# Patient Record
Sex: MALE | Race: WHITE | NOT HISPANIC OR LATINO | Employment: OTHER | URBAN - METROPOLITAN AREA
[De-identification: names, ages, dates, MRNs, and addresses within clinical notes are randomized per-mention and may not be internally consistent; named-entity substitution may affect disease eponyms.]

---

## 2017-07-18 ENCOUNTER — HOSPITAL ENCOUNTER (OUTPATIENT)
Dept: PHYSICAL MEDICINE AND REHAB | Facility: CLINIC | Age: 37
Discharge: HOME | End: 2017-07-18
Attending: PHYSICIAN ASSISTANT

## 2017-07-18 DIAGNOSIS — M54.50 LUMBAGO: ICD-10-CM

## 2017-07-18 DIAGNOSIS — M54.16 RIGHT LUMBAR RADICULITIS: ICD-10-CM

## 2017-07-18 ASSESSMENT — MIFFLIN-ST. JEOR: SCORE: 1974.26

## 2017-07-26 ENCOUNTER — HOSPITAL ENCOUNTER (OUTPATIENT)
Dept: MRI IMAGING | Facility: CLINIC | Age: 37
Discharge: HOME | End: 2017-07-26
Attending: PHYSICIAN ASSISTANT

## 2017-07-26 ENCOUNTER — COMMUNICATION - HEALTHEAST (OUTPATIENT)
Dept: TELEHEALTH | Facility: CLINIC | Age: 37
End: 2017-07-26

## 2017-07-26 DIAGNOSIS — M54.16 RIGHT LUMBAR RADICULITIS: ICD-10-CM

## 2017-07-26 DIAGNOSIS — M54.50 LUMBAGO: ICD-10-CM

## 2017-07-28 ENCOUNTER — COMMUNICATION - HEALTHEAST (OUTPATIENT)
Dept: PHYSICAL MEDICINE AND REHAB | Facility: CLINIC | Age: 37
End: 2017-07-28

## 2017-08-03 ENCOUNTER — HOSPITAL ENCOUNTER (OUTPATIENT)
Dept: PHYSICAL MEDICINE AND REHAB | Facility: CLINIC | Age: 37
Discharge: HOME | End: 2017-08-03
Attending: PHYSICIAN ASSISTANT

## 2017-08-03 DIAGNOSIS — G47.9 SLEEP DISTURBANCE: ICD-10-CM

## 2017-08-03 DIAGNOSIS — M79.18 MYOFASCIAL PAIN: ICD-10-CM

## 2017-08-03 DIAGNOSIS — M54.50 LUMBALGIA: ICD-10-CM

## 2017-08-03 DIAGNOSIS — M51.26 LUMBAR DISC HERNIATION: ICD-10-CM

## 2017-08-03 DIAGNOSIS — M54.16 LUMBAR RADICULITIS: ICD-10-CM

## 2017-08-03 ASSESSMENT — MIFFLIN-ST. JEOR: SCORE: 1961.11

## 2017-08-14 ENCOUNTER — HOSPITAL ENCOUNTER (OUTPATIENT)
Dept: PHYSICAL MEDICINE AND REHAB | Facility: CLINIC | Age: 37
Discharge: HOME | End: 2017-08-14
Attending: PHYSICIAN ASSISTANT

## 2017-08-14 DIAGNOSIS — M79.18 MYOFASCIAL PAIN: ICD-10-CM

## 2017-08-14 DIAGNOSIS — M54.16 LUMBAR RADICULITIS: ICD-10-CM

## 2017-08-14 DIAGNOSIS — M54.50 LUMBALGIA: ICD-10-CM

## 2017-08-14 DIAGNOSIS — M51.26 LUMBAR DISC HERNIATION: ICD-10-CM

## 2017-08-14 DIAGNOSIS — G47.9 SLEEP DISTURBANCE: ICD-10-CM

## 2017-08-23 ENCOUNTER — COMMUNICATION - HEALTHEAST (OUTPATIENT)
Dept: PHYSICAL MEDICINE AND REHAB | Facility: CLINIC | Age: 37
End: 2017-08-23

## 2017-08-24 ENCOUNTER — OFFICE VISIT - HEALTHEAST (OUTPATIENT)
Dept: NEUROSURGERY | Facility: CLINIC | Age: 37
End: 2017-08-24

## 2017-08-24 ENCOUNTER — HOSPITAL ENCOUNTER (OUTPATIENT)
Dept: PHYSICAL MEDICINE AND REHAB | Facility: CLINIC | Age: 37
Discharge: HOME | End: 2017-08-24
Attending: PHYSICIAN ASSISTANT

## 2017-08-24 DIAGNOSIS — M54.16 LUMBAR RADICULITIS: ICD-10-CM

## 2017-08-24 DIAGNOSIS — M79.18 MYOFASCIAL PAIN: ICD-10-CM

## 2017-08-24 DIAGNOSIS — G47.9 SLEEP DISTURBANCE: ICD-10-CM

## 2017-08-24 DIAGNOSIS — M51.26 LUMBAR DISC HERNIATION: ICD-10-CM

## 2017-08-24 DIAGNOSIS — M54.16 LUMBAR RADICULOPATHY, RIGHT: ICD-10-CM

## 2017-08-24 DIAGNOSIS — M54.50 LUMBALGIA: ICD-10-CM

## 2017-08-24 ASSESSMENT — MIFFLIN-ST. JEOR
SCORE: 1960.66
SCORE: 1962.02

## 2017-08-25 ENCOUNTER — COMMUNICATION - HEALTHEAST (OUTPATIENT)
Dept: NEUROSURGERY | Facility: CLINIC | Age: 37
End: 2017-08-25

## 2017-08-25 ENCOUNTER — RECORDS - HEALTHEAST (OUTPATIENT)
Dept: ADMINISTRATIVE | Facility: OTHER | Age: 37
End: 2017-08-25

## 2017-08-28 ENCOUNTER — ANESTHESIA - HEALTHEAST (OUTPATIENT)
Dept: SURGERY | Facility: CLINIC | Age: 37
End: 2017-08-28

## 2017-08-28 ENCOUNTER — SURGERY - HEALTHEAST (OUTPATIENT)
Dept: SURGERY | Facility: CLINIC | Age: 37
End: 2017-08-28

## 2017-08-28 ASSESSMENT — MIFFLIN-ST. JEOR: SCORE: 1950.73

## 2017-08-29 ENCOUNTER — COMMUNICATION - HEALTHEAST (OUTPATIENT)
Dept: NEUROSURGERY | Facility: CLINIC | Age: 37
End: 2017-08-29

## 2017-09-05 ENCOUNTER — AMBULATORY - HEALTHEAST (OUTPATIENT)
Dept: NEUROSURGERY | Facility: CLINIC | Age: 37
End: 2017-09-05

## 2017-09-05 DIAGNOSIS — Z51.89 VISIT FOR WOUND CHECK: ICD-10-CM

## 2017-09-20 ENCOUNTER — COMMUNICATION - HEALTHEAST (OUTPATIENT)
Dept: PHYSICAL MEDICINE AND REHAB | Facility: CLINIC | Age: 37
End: 2017-09-20

## 2017-09-20 DIAGNOSIS — M54.16 RIGHT LUMBAR RADICULITIS: ICD-10-CM

## 2017-10-06 ENCOUNTER — OFFICE VISIT - HEALTHEAST (OUTPATIENT)
Dept: NEUROSURGERY | Facility: CLINIC | Age: 37
End: 2017-10-06

## 2017-10-06 DIAGNOSIS — M51.16 LUMBAR DISC HERNIATION WITH RADICULOPATHY: ICD-10-CM

## 2017-10-06 ASSESSMENT — MIFFLIN-ST. JEOR: SCORE: 1950.45

## 2018-10-30 ENCOUNTER — COMMUNICATION - HEALTHEAST (OUTPATIENT)
Dept: PHYSICAL MEDICINE AND REHAB | Facility: CLINIC | Age: 38
End: 2018-10-30

## 2018-10-31 ENCOUNTER — HOSPITAL ENCOUNTER (OUTPATIENT)
Dept: PHYSICAL MEDICINE AND REHAB | Facility: CLINIC | Age: 38
Discharge: HOME | End: 2018-10-31
Attending: NURSE PRACTITIONER

## 2018-10-31 DIAGNOSIS — M47.816 LUMBAR FACET ARTHROPATHY: ICD-10-CM

## 2018-10-31 DIAGNOSIS — M54.50 ACUTE MIDLINE LOW BACK PAIN WITHOUT SCIATICA: ICD-10-CM

## 2018-10-31 DIAGNOSIS — M79.18 MYOFASCIAL PAIN: ICD-10-CM

## 2018-10-31 DIAGNOSIS — M51.369 ANNULAR TEAR OF LUMBAR DISC: ICD-10-CM

## 2018-10-31 DIAGNOSIS — Z98.890 HISTORY OF LUMBAR SURGERY: ICD-10-CM

## 2020-02-20 ENCOUNTER — HOSPITAL ENCOUNTER (OUTPATIENT)
Dept: PHYSICAL MEDICINE AND REHAB | Facility: CLINIC | Age: 40
Discharge: HOME | End: 2020-02-20
Attending: PHYSICAL MEDICINE & REHABILITATION

## 2020-02-20 DIAGNOSIS — G47.9 SLEEP DIFFICULTIES: ICD-10-CM

## 2020-02-20 DIAGNOSIS — M54.16 LUMBAR RADICULAR PAIN: ICD-10-CM

## 2020-02-20 DIAGNOSIS — R20.2 PARESTHESIA OF BOTH HANDS: ICD-10-CM

## 2020-02-20 DIAGNOSIS — M51.369 DDD (DEGENERATIVE DISC DISEASE), LUMBAR: ICD-10-CM

## 2020-02-20 ASSESSMENT — MIFFLIN-ST. JEOR: SCORE: 1995.81

## 2020-02-24 ENCOUNTER — HOSPITAL ENCOUNTER (OUTPATIENT)
Dept: MRI IMAGING | Facility: CLINIC | Age: 40
Discharge: HOME | End: 2020-02-24
Attending: PHYSICAL MEDICINE & REHABILITATION

## 2020-02-24 DIAGNOSIS — M54.16 LUMBAR RADICULAR PAIN: ICD-10-CM

## 2020-02-24 DIAGNOSIS — M51.369 DDD (DEGENERATIVE DISC DISEASE), LUMBAR: ICD-10-CM

## 2020-02-24 ASSESSMENT — MIFFLIN-ST. JEOR: SCORE: 1959.52

## 2020-02-25 ENCOUNTER — COMMUNICATION - HEALTHEAST (OUTPATIENT)
Dept: PHYSICAL MEDICINE AND REHAB | Facility: CLINIC | Age: 40
End: 2020-02-25

## 2020-02-25 ENCOUNTER — AMBULATORY - HEALTHEAST (OUTPATIENT)
Dept: PHYSICAL MEDICINE AND REHAB | Facility: CLINIC | Age: 40
End: 2020-02-25

## 2020-02-25 DIAGNOSIS — M54.16 LUMBAR RADICULAR PAIN: ICD-10-CM

## 2020-03-05 ENCOUNTER — HOSPITAL ENCOUNTER (OUTPATIENT)
Dept: PHYSICAL MEDICINE AND REHAB | Facility: CLINIC | Age: 40
Discharge: HOME | End: 2020-03-05
Attending: PHYSICAL MEDICINE & REHABILITATION

## 2020-03-05 DIAGNOSIS — M51.26 LUMBAR DISC HERNIATION: ICD-10-CM

## 2020-03-05 DIAGNOSIS — M54.16 LUMBAR RADICULAR PAIN: ICD-10-CM

## 2020-03-05 DIAGNOSIS — M51.369 DDD (DEGENERATIVE DISC DISEASE), LUMBAR: ICD-10-CM

## 2020-03-05 DIAGNOSIS — R20.2 PARESTHESIA OF BOTH HANDS: ICD-10-CM

## 2020-03-05 ASSESSMENT — MIFFLIN-ST. JEOR: SCORE: 2027.56

## 2020-03-10 ENCOUNTER — AMBULATORY - HEALTHEAST (OUTPATIENT)
Dept: NEUROSURGERY | Facility: CLINIC | Age: 40
End: 2020-03-10

## 2020-03-10 DIAGNOSIS — M54.9 BACK PAIN: ICD-10-CM

## 2020-03-12 ENCOUNTER — HOSPITAL ENCOUNTER (OUTPATIENT)
Dept: RADIOLOGY | Facility: CLINIC | Age: 40
Discharge: HOME | End: 2020-03-12
Attending: NEUROLOGICAL SURGERY

## 2020-03-12 DIAGNOSIS — M54.9 BACK PAIN: ICD-10-CM

## 2020-03-17 ENCOUNTER — COMMUNICATION - HEALTHEAST (OUTPATIENT)
Dept: NEUROSURGERY | Facility: CLINIC | Age: 40
End: 2020-03-17

## 2020-04-23 ENCOUNTER — OFFICE VISIT - HEALTHEAST (OUTPATIENT)
Dept: NEUROSURGERY | Facility: CLINIC | Age: 40
End: 2020-04-23

## 2020-04-23 DIAGNOSIS — M54.18 RIGHT SACRAL RADICULOPATHY: ICD-10-CM

## 2020-04-23 DIAGNOSIS — M51.26 RECURRENT HERNIATION OF LUMBAR DISC: ICD-10-CM

## 2020-04-23 ASSESSMENT — MIFFLIN-ST. JEOR: SCORE: 1970.87

## 2020-04-29 ENCOUNTER — HOSPITAL ENCOUNTER (OUTPATIENT)
Dept: PHYSICAL MEDICINE AND REHAB | Facility: CLINIC | Age: 40
Discharge: HOME | End: 2020-04-29
Attending: PHYSICAL MEDICINE & REHABILITATION

## 2020-04-29 ENCOUNTER — COMMUNICATION - HEALTHEAST (OUTPATIENT)
Dept: PHYSICAL MEDICINE AND REHAB | Facility: CLINIC | Age: 40
End: 2020-04-29

## 2020-04-29 DIAGNOSIS — M54.16 LUMBAR RADICULAR PAIN: ICD-10-CM

## 2020-04-29 DIAGNOSIS — M51.26 LUMBAR DISC HERNIATION: ICD-10-CM

## 2020-05-08 ENCOUNTER — HOSPITAL ENCOUNTER (OUTPATIENT)
Dept: PHYSICAL MEDICINE AND REHAB | Facility: CLINIC | Age: 40
Discharge: HOME | End: 2020-05-08
Attending: PHYSICAL MEDICINE & REHABILITATION

## 2020-05-08 ENCOUNTER — COMMUNICATION - HEALTHEAST (OUTPATIENT)
Dept: PHYSICAL MEDICINE AND REHAB | Facility: CLINIC | Age: 40
End: 2020-05-08

## 2020-05-08 DIAGNOSIS — M51.26 LUMBAR DISC HERNIATION: ICD-10-CM

## 2020-05-08 DIAGNOSIS — M54.16 LUMBAR RADICULAR PAIN: ICD-10-CM

## 2020-05-08 DIAGNOSIS — K59.03 DRUG-INDUCED CONSTIPATION: ICD-10-CM

## 2020-05-14 ENCOUNTER — SURGERY - HEALTHEAST (OUTPATIENT)
Dept: NEUROSURGERY | Facility: CLINIC | Age: 40
End: 2020-05-14

## 2020-05-14 ENCOUNTER — RECORDS - HEALTHEAST (OUTPATIENT)
Dept: ADMINISTRATIVE | Facility: OTHER | Age: 40
End: 2020-05-14

## 2020-05-14 ENCOUNTER — AMBULATORY - HEALTHEAST (OUTPATIENT)
Dept: NEUROSURGERY | Facility: CLINIC | Age: 40
End: 2020-05-14

## 2020-05-14 DIAGNOSIS — M51.26 LUMBAR DISC HERNIATION: ICD-10-CM

## 2020-05-14 DIAGNOSIS — Z11.59 ENCOUNTER FOR SCREENING FOR OTHER VIRAL DISEASES: ICD-10-CM

## 2020-05-14 DIAGNOSIS — M54.18 RIGHT SACRAL RADICULOPATHY: ICD-10-CM

## 2020-05-15 ENCOUNTER — AMBULATORY - HEALTHEAST (OUTPATIENT)
Dept: NEUROSURGERY | Facility: CLINIC | Age: 40
End: 2020-05-15

## 2020-05-15 ENCOUNTER — COMMUNICATION - HEALTHEAST (OUTPATIENT)
Dept: NEUROSURGERY | Facility: CLINIC | Age: 40
End: 2020-05-15

## 2020-05-15 DIAGNOSIS — Z01.818 PRE-OP EXAM: ICD-10-CM

## 2020-05-19 ENCOUNTER — COMMUNICATION - HEALTHEAST (OUTPATIENT)
Dept: PHYSICAL MEDICINE AND REHAB | Facility: CLINIC | Age: 40
End: 2020-05-19

## 2020-05-20 ENCOUNTER — AMBULATORY - HEALTHEAST (OUTPATIENT)
Dept: MULTI SPECIALTY CLINIC | Facility: CLINIC | Age: 40
End: 2020-05-20

## 2020-05-20 LAB — CREAT SERPL-MCNC: 0.7 MG/DL (ref 0.7–1.4)

## 2020-05-21 ENCOUNTER — HOSPITAL ENCOUNTER (OUTPATIENT)
Dept: PHYSICAL MEDICINE AND REHAB | Facility: CLINIC | Age: 40
Discharge: HOME | End: 2020-05-21
Attending: PHYSICAL MEDICINE & REHABILITATION

## 2020-05-21 ENCOUNTER — RECORDS - HEALTHEAST (OUTPATIENT)
Dept: ADMINISTRATIVE | Facility: OTHER | Age: 40
End: 2020-05-21

## 2020-05-21 DIAGNOSIS — K59.03 DRUG-INDUCED CONSTIPATION: ICD-10-CM

## 2020-05-21 DIAGNOSIS — M54.16 LUMBAR RADICULAR PAIN: ICD-10-CM

## 2020-05-21 DIAGNOSIS — M51.26 LUMBAR DISC HERNIATION: ICD-10-CM

## 2020-05-22 ENCOUNTER — HOSPITAL ENCOUNTER (OUTPATIENT)
Dept: PHYSICAL MEDICINE AND REHAB | Facility: CLINIC | Age: 40
Discharge: HOME | End: 2020-05-22
Attending: NURSE PRACTITIONER

## 2020-05-22 ENCOUNTER — RECORDS - HEALTHEAST (OUTPATIENT)
Dept: ADMINISTRATIVE | Facility: OTHER | Age: 40
End: 2020-05-22

## 2020-05-22 ENCOUNTER — COMMUNICATION - HEALTHEAST (OUTPATIENT)
Dept: PHYSICAL MEDICINE AND REHAB | Facility: CLINIC | Age: 40
End: 2020-05-22

## 2020-05-22 DIAGNOSIS — M51.26 LUMBAR DISC HERNIATION: ICD-10-CM

## 2020-05-22 DIAGNOSIS — Z98.890 HISTORY OF LUMBAR SURGERY: ICD-10-CM

## 2020-05-22 DIAGNOSIS — M54.16 LUMBAR RADICULAR PAIN: ICD-10-CM

## 2020-05-26 ENCOUNTER — AMBULATORY - HEALTHEAST (OUTPATIENT)
Dept: FAMILY MEDICINE | Facility: CLINIC | Age: 40
End: 2020-05-26

## 2020-05-26 DIAGNOSIS — Z11.59 ENCOUNTER FOR SCREENING FOR OTHER VIRAL DISEASES: ICD-10-CM

## 2020-05-27 ENCOUNTER — COMMUNICATION - HEALTHEAST (OUTPATIENT)
Dept: NEUROSURGERY | Facility: CLINIC | Age: 40
End: 2020-05-27

## 2020-05-27 DIAGNOSIS — Z01.818 PRE-OP EXAM: ICD-10-CM

## 2020-05-28 ENCOUNTER — ANESTHESIA - HEALTHEAST (OUTPATIENT)
Dept: SURGERY | Facility: HOSPITAL | Age: 40
End: 2020-05-28

## 2020-05-28 ENCOUNTER — SURGERY - HEALTHEAST (OUTPATIENT)
Dept: SURGERY | Facility: HOSPITAL | Age: 40
End: 2020-05-28

## 2020-06-09 ENCOUNTER — COMMUNICATION - HEALTHEAST (OUTPATIENT)
Dept: NEUROSURGERY | Facility: CLINIC | Age: 40
End: 2020-06-09

## 2020-06-10 ENCOUNTER — AMBULATORY - HEALTHEAST (OUTPATIENT)
Dept: NEUROSURGERY | Facility: CLINIC | Age: 40
End: 2020-06-10

## 2020-06-10 DIAGNOSIS — M79.606 LEG PAIN: ICD-10-CM

## 2020-06-10 DIAGNOSIS — Z51.89 VISIT FOR WOUND CHECK: ICD-10-CM

## 2020-06-10 ASSESSMENT — MIFFLIN-ST. JEOR: SCORE: 1952.73

## 2020-07-09 ENCOUNTER — OFFICE VISIT - HEALTHEAST (OUTPATIENT)
Dept: NEUROSURGERY | Facility: CLINIC | Age: 40
End: 2020-07-09

## 2020-07-09 DIAGNOSIS — M51.26 HERNIATED LUMBAR INTERVERTEBRAL DISC: ICD-10-CM

## 2020-07-29 ENCOUNTER — RECORDS - HEALTHEAST (OUTPATIENT)
Dept: ADMINISTRATIVE | Facility: OTHER | Age: 40
End: 2020-07-29

## 2020-10-27 ENCOUNTER — COMMUNICATION - HEALTHEAST (OUTPATIENT)
Dept: NEUROSURGERY | Facility: CLINIC | Age: 40
End: 2020-10-27

## 2020-11-24 ENCOUNTER — COMMUNICATION - HEALTHEAST (OUTPATIENT)
Dept: NEUROSURGERY | Facility: CLINIC | Age: 40
End: 2020-11-24

## 2020-12-02 ENCOUNTER — COMMUNICATION - HEALTHEAST (OUTPATIENT)
Dept: NEUROSURGERY | Facility: CLINIC | Age: 40
End: 2020-12-02

## 2020-12-10 ENCOUNTER — OFFICE VISIT - HEALTHEAST (OUTPATIENT)
Dept: NEUROSURGERY | Facility: CLINIC | Age: 40
End: 2020-12-10

## 2020-12-10 DIAGNOSIS — M54.18 SACRAL RADICULOPATHY: ICD-10-CM

## 2020-12-28 ENCOUNTER — HOSPITAL ENCOUNTER (OUTPATIENT)
Dept: MRI IMAGING | Facility: CLINIC | Age: 40
Discharge: HOME | End: 2020-12-28
Attending: NEUROLOGICAL SURGERY

## 2020-12-28 ENCOUNTER — HOSPITAL ENCOUNTER (OUTPATIENT)
Dept: RADIOLOGY | Facility: CLINIC | Age: 40
Discharge: HOME | End: 2020-12-28
Attending: NEUROLOGICAL SURGERY

## 2020-12-28 DIAGNOSIS — M54.18 SACRAL RADICULOPATHY: ICD-10-CM

## 2021-01-26 ENCOUNTER — OFFICE VISIT - HEALTHEAST (OUTPATIENT)
Dept: NEUROSURGERY | Facility: CLINIC | Age: 41
End: 2021-01-26

## 2021-01-26 DIAGNOSIS — M54.16 LUMBAR RADICULOPATHY: ICD-10-CM

## 2021-01-26 ASSESSMENT — MIFFLIN-ST. JEOR: SCORE: 1952.73

## 2021-02-01 ENCOUNTER — OFFICE VISIT - HEALTHEAST (OUTPATIENT)
Dept: NEUROSURGERY | Facility: CLINIC | Age: 41
End: 2021-02-01

## 2021-02-01 DIAGNOSIS — M54.16 LUMBAR RADICULOPATHY: ICD-10-CM

## 2021-02-02 ENCOUNTER — COMMUNICATION - HEALTHEAST (OUTPATIENT)
Dept: NEUROSURGERY | Facility: CLINIC | Age: 41
End: 2021-02-02

## 2021-02-05 ENCOUNTER — OFFICE VISIT - HEALTHEAST (OUTPATIENT)
Dept: NEUROSURGERY | Facility: CLINIC | Age: 41
End: 2021-02-05

## 2021-02-05 DIAGNOSIS — M54.16 LUMBAR RADICULOPATHY: ICD-10-CM

## 2021-02-09 ENCOUNTER — COMMUNICATION - HEALTHEAST (OUTPATIENT)
Dept: NEUROSURGERY | Facility: CLINIC | Age: 41
End: 2021-02-09

## 2021-02-12 ENCOUNTER — OFFICE VISIT - HEALTHEAST (OUTPATIENT)
Dept: NEUROSURGERY | Facility: CLINIC | Age: 41
End: 2021-02-12

## 2021-02-12 DIAGNOSIS — M51.26 LUMBAR HERNIATED DISC: ICD-10-CM

## 2021-02-12 ASSESSMENT — MIFFLIN-ST. JEOR: SCORE: 1943.65

## 2021-05-25 ENCOUNTER — RECORDS - HEALTHEAST (OUTPATIENT)
Dept: ADMINISTRATIVE | Facility: CLINIC | Age: 41
End: 2021-05-25

## 2021-05-31 VITALS — HEIGHT: 70 IN | WEIGHT: 232.3 LBS | BODY MASS INDEX: 33.26 KG/M2

## 2021-05-31 VITALS — BODY MASS INDEX: 33.21 KG/M2 | HEIGHT: 70 IN | WEIGHT: 232 LBS

## 2021-05-31 VITALS — HEIGHT: 70 IN | BODY MASS INDEX: 32.64 KG/M2 | WEIGHT: 228 LBS

## 2021-05-31 VITALS — WEIGHT: 232.1 LBS | HEIGHT: 70 IN | BODY MASS INDEX: 33.23 KG/M2

## 2021-05-31 VITALS — HEIGHT: 70 IN | BODY MASS INDEX: 32.65 KG/M2 | WEIGHT: 228.06 LBS

## 2021-05-31 VITALS — BODY MASS INDEX: 33.64 KG/M2 | WEIGHT: 235 LBS | HEIGHT: 70 IN

## 2021-06-02 VITALS — WEIGHT: 237 LBS | BODY MASS INDEX: 34.01 KG/M2

## 2021-06-04 VITALS — WEIGHT: 230 LBS | BODY MASS INDEX: 32.93 KG/M2 | HEIGHT: 70 IN

## 2021-06-04 VITALS
BODY MASS INDEX: 34.96 KG/M2 | HEIGHT: 69 IN | HEART RATE: 64 BPM | OXYGEN SATURATION: 96 % | WEIGHT: 236 LBS | DIASTOLIC BLOOD PRESSURE: 80 MMHG | SYSTOLIC BLOOD PRESSURE: 118 MMHG

## 2021-06-04 VITALS — WEIGHT: 245 LBS | BODY MASS INDEX: 35.07 KG/M2 | HEIGHT: 70 IN

## 2021-06-04 VITALS
SYSTOLIC BLOOD PRESSURE: 107 MMHG | BODY MASS INDEX: 34.36 KG/M2 | WEIGHT: 232 LBS | HEIGHT: 69 IN | HEART RATE: 69 BPM | OXYGEN SATURATION: 98 % | DIASTOLIC BLOOD PRESSURE: 61 MMHG

## 2021-06-04 VITALS — WEIGHT: 238 LBS | HEIGHT: 70 IN | BODY MASS INDEX: 34.07 KG/M2

## 2021-06-04 VITALS — WEIGHT: 232 LBS | BODY MASS INDEX: 34.26 KG/M2

## 2021-06-05 VITALS
WEIGHT: 230 LBS | HEIGHT: 69 IN | OXYGEN SATURATION: 96 % | RESPIRATION RATE: 16 BRPM | BODY MASS INDEX: 34.07 KG/M2 | SYSTOLIC BLOOD PRESSURE: 122 MMHG | DIASTOLIC BLOOD PRESSURE: 84 MMHG | HEART RATE: 60 BPM

## 2021-06-05 VITALS
HEART RATE: 69 BPM | SYSTOLIC BLOOD PRESSURE: 105 MMHG | HEIGHT: 69 IN | DIASTOLIC BLOOD PRESSURE: 64 MMHG | BODY MASS INDEX: 34.36 KG/M2 | OXYGEN SATURATION: 96 % | WEIGHT: 232 LBS

## 2021-06-06 NOTE — TELEPHONE ENCOUNTER
"Call received from pt and pt's wife, Candy, inquiring if there are any other medication recommendations for the patient at this time. Pt saw Dr. Brown on 2/20/2020 but has also seen Shu in 2018. They are leaving for [place] tomorrow AM. \"We just are wondering what to do if things flare up while we're down there and if there's anything he can take. We'd rather ask the questions now then have him suffer down there\".     Pt is currently using gabapentin 100 mg capsules. Advised that he could go up to 3 capsules at bedtime as tolerated. He is not using anything else for pain. He had been advised not to use anti-inflammatories due to a bad renal issues as a kid with ibuprofen. Instructed that he could try ES Tylenol 500-1,000 mg 3 times daily as needed.     They are aware Dr. Brown is not in clinic this afternoon. Will send message to Shu to see if there are any other medication recommendations that could be given at this time. Pharmacy verified should medication be prescribed.       "

## 2021-06-06 NOTE — TELEPHONE ENCOUNTER
Called and spoke with José Antonio who reported intermittent pain in his right leg, denies weakness or progressive numbness. Will reschedule his appt in 4 weeks. Enc to take a prednisone burst should his pain worsens.  Dulce Maria Pozo RN, CNRN

## 2021-06-06 NOTE — TELEPHONE ENCOUNTER
Chart reviewed.  There is some nerve compression noted on his MRI therefore I did place an order for Medrol Dosepak that he could take with intake if needed for flareups.  However if his pain is manageable then he does not need to take this medication.   He could also increase his gabapentin if symptoms are worsening slowly titrating up to 3 tablets 3 times daily as tolerated.

## 2021-06-06 NOTE — PROGRESS NOTES
Assessment/Plan:      Diagnoses and all orders for this visit:    Paresthesia of both hands  -     EMG - Clinic; Standing  -     EMG - Clinic    Lumbar radicular pain  -     Ambulatory referral to Neurosurgery    Lumbar disc herniation  -     Ambulatory referral to Neurosurgery    DDD (degenerative disc disease), lumbar  -     Ambulatory referral to Neurosurgery        Assessment: Pleasant 39 y.o. male with a history of anxiety and as a kid had kidney failure from ibuprofen that resolved, and L5-S1 right lumbar microdiscectomy in 2017 with Dr. Disla with:    1.  Bilateral hand paresthesias intermittent left slightly worse than right.  He has intermittent zingers in his left wrist.  EMG was negative today.   clinically, this represents carpal tunnel syndrome or some dynamic mechanical nerve irritation at the wrist without positive EMG findings.    2.  2-month history of low back with right lower extremity radicular pain to the gluteal region and proximal thigh.  He has a recurrent right paracentral disc herniation compressing the right S1 nerve in the lateral recess.  He has absent right Achilles reflex which may be residual from prior surgery or new.    3.  Degenerative disc disease L4-5 L5-S1.      Discussion:    1.  We discussed the diagnosis and treatment options of the wrist along with the MRI of the lumbar spine and treatment options for the herniated disc.    2.  For the hands, this is not as bothersome as the legs.  He will trial carpal tunnel splints at bedtime to see if it would be helpful.    3.  Can consider imaging of the wrists in the future to evaluate for any mechanical issue or tendon issue contributing to his symptoms.    4.  We discussed the diagnosis and treatment options for the herniated disc.  This includes conservative options of physical therapy injections and medications.  At this time he is not wanting to do anything conservative as he has had therapy in the past prior to his surgery which  caused severe flare of his pain and subsequently had to have a urgent surgery for worsening pain and progressive decline in function.    5.  He would like a surgical referral and I will refer him to Dr. Young or Dr. White for an evaluation.  6.  He has a prednisone burst at home if he needs to take this he has not needed to take this while traveling.    7.  Follow-up with me as needed after seeing neurosurgery        It was our pleasure caring for your patient today, if there any questions or concerns please do not hesitate to contact us.      Subjective:   Patient ID: José Antonio Reynoso is a 39 y.o. male.    History of Present Illness: *Patient presents for an evaluation of bilateral hand paresthesias after EMG also follow-up of MRI for lumbar spine and right leg paresthesias and radicular pain.  His hand symptoms are unchanged.  Has been present for at least 6 months.  He gets occasional zingers up into his wrist sharp pain in his hand on the left with pulling objects or opening pop can.  Otherwise he has numbness and tingling all digits of both hands without any significant weakness of the hands.  This has not changed.  Recent EMG was done and reviewed with the patient.    He also has ongoing low back pain with right leg pain down to the back of the leg to the knee.  Worse with any lifting and bending better with rest.  He recently was in [place].  Prior to leaving he was given a prednisone burst but he did not take that on his trip as it was not necessary.  Has not taken any pain medications with normal activity seems to be okay but he still has significant issues down his leg.    Prior to his surgery 2 years ago he was having pain and was sent to therapy and had progressive decline and increased pain and subsequently had relatively urgent lumbar discectomy after lumbar epidural failed to improve his symptoms.    Imaging: MRI report and images were personally reviewed and discussed with the patient.  A plastic model  was utilized during the discussion.  MRI of the lumbar spine personally reviewed.  He has L4-5 degenerative disc disease.  L5-S1 right paracentral disc herniation which appears recurrent compressing the right S1 nerve in the lateral recess.  When I initially receive this report I did contact the radiologist directly who stated that contrast dye was not needed that this was definitively herniated disc.    Review of Systems: No  weakness.  No bowel or bladder incontinence.  No urinary retention.  No fevers, unintentional weight loss, balance changes, headaches, frequent falling, difficulty swallowing, or coordination difficulties.         Past Medical History:   Diagnosis Date     Anxiety      Back pain      Kidney failure      Kidney stones        The following portions of the patient's history were reviewed and updated as appropriate: allergies, current medications, past family history, past medical history, past social history, past surgical history and problem list.           Objective:   Physical Exam:    Vitals:    03/05/20 1244   BP: 128/76   Pulse: 71     Body mass index is 35.15 kg/m .      General: Alert and oriented with normal affect. Attention, knowledge, memory, and language are intact. No acute distress.   Eyes: Sclerae are clear.  Respirations: Unlabored. CV: No lower extremity edema.  Skin: No rashes seen.    Gait:  Nonantalgic  Negative Tinel's at the wrist.  Sensation is i slightly decreased to light touch right lateral malleolus intact upper extremities.  Reflexes are negative Hoffmans. 2+ patellar and 0 right, 2+ left Mary     Manual muscle testing reveals:  Right /Left out of 5     5/5 wrist extensors  5/5 interosseus  5/5 finger flexors     5/5 knee flexors  5/5 knee extensors  5/5 ankle plantar flexors  5/5 ankle dorsiflexors  5/5  EHL

## 2021-06-06 NOTE — TELEPHONE ENCOUNTER
Call to pt's wife and pt with response. They will  medrol dose bianca from pharmacy. They expressed appreciation for the quick response.

## 2021-06-06 NOTE — PATIENT INSTRUCTIONS - HE
1. Try carpal tunnel splints at bedtime for your hands. Over the counter        Thank you for choosing the [facility] for your EMG testing.    The ordering provider will receive your final EMG results within the next few days.  Please follow up with your provider for the results and further treatment recommendations.

## 2021-06-06 NOTE — PATIENT INSTRUCTIONS - HE
1. Will get MRI of your back and EMG of your hands    2. Try gabapentin at bedtime for pain and sleep    Prescribed Gabapentin today, 100 mg tablets, to be titrated up to 3 tablets at bedtime as tolerated for your nerve pain. Please follow Gabapentin dosing chart below. If your pain is still significant in the mornings, you may then start taking the medication in the morning and then an afternoon dose.    Gabapentin 100mg Dosing Chart    DATE  MORNING AFTERNOON BEDTIME    Day 1 0 0 1    Day 2 0 0 1    Day 3 0 0 1    Day 4 0 0 2    Day 5 0 0 2    Day 6 0 0 2    Day 7 0 0 3    Day 8 0 0 3    Day 9 0 0 3    Day 10 1 0 3    Day 11 1 0 3    Day 12 1 0 3    Day 13 2 0 3    Day 14 2 0 3    Day 15 2 0 3    Day 16 3 0 3    Day 17 3 0 3    Day 18 3 0 3    Day 19 3 1 3    Day 20 3 1 3    Day 21 3 1 3    Day 22 3 2 3    Day 23 3 2 3    Day 24 3 2 3    Day 25 3 3 3    Day 26 3 3 3    Day 27 3 3 3     Continue medication, taking 3 capsules three times daily    Please call if you have any questions regarding how to take your medication  Clinic Phone # [phone]

## 2021-06-06 NOTE — TELEPHONE ENCOUNTER
----- Message from Daniel Brown DO sent at 2/24/2020  5:00 PM CST -----  MRI reviewed lumbar spine I also spoke with radiology.  He has a persistent slightly worse disc herniation on the right at L5-S1 which does contact the right S1 nerve which may be causing his persistent symptoms.    Recommend getting started in physical therapy if he wishes otherwise he can return as planned after EMG to discuss results and treatment options.

## 2021-06-06 NOTE — TELEPHONE ENCOUNTER
Call to pt with provider's results and recommendations. Pt stated understanding. Pt is leaving on a trip to [place] tomorrow AM. When he returns, he states he will call and schedule EMG and follow-up appointment with Dr. Brown.

## 2021-06-06 NOTE — PROGRESS NOTES
Patient presents at the request of Dr. Daniel Brown for a bilateral upper extremity EMG.  He has bilateral hand numbness and tingling all digits of both hands left is equal to or slightly worse than right he is right-handed.  Has occasional zinger in his left wrist.    EMG/NCS  results: Please see scanned full report    Comment NCS: Normal study  1.  Normal nerve conduction studies bilateral upper extremities.    Comment EMG: Normal study  1.  Normal needle EMG bilateral upper extremities .    Interpretation: Normal study    1. There is no electrodiagnostic evidence of cervical radiculopathy, brachial plexopathy, or focal neuropathy in the bilateral upper extremities.    The testing was completed in its entirety by the physician.       It was our pleasure caring for your patient today, if there any questions or concerns please do not hesitate to contact us.

## 2021-06-07 NOTE — PATIENT INSTRUCTIONS - HE
You were taught for a Re-do right L5-S1 Microdisc.   You will be called to schedule this when we able to proceed.   Take the prednisone as prescribed and follow up in clinic as needed.    Provided complete information about approaching surgery.  Discussed discharge planning and expected outcomes after surgery as well as follow-ups and restrictions.  Emphasized on stop taking ASA, NSAID's, vitamins and /or OTC herbal supplements within 10 days and any anticoagulant meds within 7 days prior to surgery.  Reminded patient to bring all pertinent films to hospital the day of surgery.    NPO after midnight, Please arrive 2.5 hours prior to scheduled surgery.    Using a washcloth and a bottle of provided Hibiclens, wash your body, avoiding your face and genitals. Preferably, shower the night before surgery and the morning of surgery using a half a bottle each time for your whole body shower. If you are unable to take a shower in the morning of surgery, please discuss your options with the nurse at your readiness visit.     Provided written pamphlets about surgery and Hibiclens bottle.  Answered all questions.  The  will call patient with all pre-op orders and instructions.  Patient to complete all required diagnostic tests prior to surgery.  If test are not completed this will cancel the surgery; contact the clinic nurses at [phone] if unable to complete test.

## 2021-06-07 NOTE — PATIENT INSTRUCTIONS - HE
1.  Increase her gabapentin to 100 mg in the morning and 100 mg in theafternoon 300 mg at bedtime.    2.  Try tramadol as needed for pain    3.  Complete the Medrol Dosepak

## 2021-06-07 NOTE — PROGRESS NOTES
"José Antonio Reynoso is a 39 y.o. male who is being evaluated via a billable video visit.      The patient has been notified of following:     \"This video visit will be conducted via a call between you and your physician/provider. We have found that certain health care needs can be provided without the need for an in-person physical exam.  This service lets us provide the care you need with a video conversation.  If a prescription is necessary we can send it directly to your pharmacy.  If lab work is needed we can place an order for that and you can then stop by our lab to have the test done at a later time.    Video visits are billed at different rates depending on your insurance coverage. Please reach out to your insurance provider with any questions.    If during the course of the call the physician/provider feels a video visit is not appropriate, you will not be charged for this service.\"    Patient has given verbal consent to a Video visit? Yes    Patient would like to receive their AVS by AVS Preference: Sukhwinder.    Patient would like the video invitation sent by: Text to cell phone: [phone]    Will anyone else be joining your video visit? No        Video Start Time: 1305    Additional provider notes:     Assessment/Plan:      Diagnoses and all orders for this visit:    Lumbar radicular pain  -     traMADoL (ULTRAM) 50 mg tablet; Take 1-2 tablets ( mg total) by mouth 3 (three) times a day as needed for pain.  Dispense: 30 tablet; Refill: 0    Lumbar disc herniation  -     traMADoL (ULTRAM) 50 mg tablet; Take 1-2 tablets ( mg total) by mouth 3 (three) times a day as needed for pain.  Dispense: 30 tablet; Refill: 0        Assessment: Pleasant 39 y.o. male with a history of anxiety and as a kid had kidney failure from ibuprofen that resolved, and L5-S1 right lumbar microdiscectomy in 2017 with Dr. Disla with:    1.  Significant increase in lumbar spine and right lumbar radicular pain.  He has a recurrent " right paracentral disc herniation compressing the right S1 nerve with severe flare of his pain over the past 2 days.  This is compressing the right S1 nerve in the lateral recess.  Plans for surgical intervention but on hold due to coronavirus.      Discussion:    1.  I discussed the diagnosis.  We discussed that this is a flare for the past few days of unknown etiology likely related to aggravation of the nerve about the disc herniation.  We discussed options of injection although this is only been present for a couple of days and would like to try some medications initially and the patient agrees.    2.  Increase gabapentin to 100 mg in the morning and afternoon and 300 mg at bedtime.    3.  We will provide short course of tramadol 50 to 100 mg 3 times daily as needed for pain.   checked and no opioid prescriptions have been provided recently    4.  He may try ice as needed.    5.  He can consider lumbar epidural steroid injection if his symptoms do not improve however he is hoping to get his surgery scheduled confirmed in the next few days we will have more information.    6.  Follow-up as needed if his symptoms do not improve.    It was our pleasure caring for your patient today, if there any questions or concerns please do not hesitate to contact us.      Subjective:   Patient ID: José Antonio Reynoso is a 39 y.o. male.    History of Present Illness:The patient presents for evaluation of worsening right gluteal pain low back pain.  He has been seen for low back pain and right leg radicular pain from a disc herniation and has been sent to Dr. Young.  She recommended repeat microdiscectomy.  Since the time of her last visit with him, the note has been reviewed, he has developed increase in pain.  Over the past 2 to 3 days the pain increased without any new injury.  The pain is severe in the gluteal region he is a difficult time with any standing or even driving.  Laying down can be painful if he is moving at all and it  feels as if he has a wallet still in his pocket and feels a tightness in his gluteal region.  Travels down the back of the leg to the knee.  No numbness or tingling.  It is interfering with his sleep.  Since the flare of his pain he started taking his Medrol Dosepak but has not helped as of yet.  Started this yesterday.  He had this from a prior prescription.  Also started gabapentin took 100 mg at bedtime.  Taking Tylenol during the day.  Cannot take ibuprofen or NSAIDs due to prior history of renal failure.      Imaging: I personally reviewed MRI lumbar spine which was done in February 2020.  This shows progressed degenerative disc disease at L4-5 L5-S1 with recurrent right disc extrusion L5-S1 compressing the right S1 nerve in the lateral recess.    Review of Systems: *No numbness, tingling or weakness.  No bowel or bladder incontinence.  No urinary retention.  No fevers, unintentional weight loss, balance changes, headaches, frequent falling, difficulty swallowing, or coordination difficulties.           Past Medical History:   Diagnosis Date     Anxiety      Back pain      Kidney failure      Kidney stones      Low back pain      Lumbosacral disc disease        The following portions of the patient's history were reviewed and updated as appropriate: allergies, current medications, past family history, past medical history, past social history, past surgical history and problem list.           Objective:   Physical Exam:    There were no vitals filed for this visit.  There is no height or weight on file to calculate BMI.        General:  Well-appearing male in no acute distress.  Pleasant,   cooperative, and interactive throughout the examination and interview.  HEENT: Head atraumatic normocephalic, sclera clear.  Skin: No rashes or lesions seen over the face.  Respirations unlabored.  MSK: Gait is cautious but difficult to fully appreciate.  Able to heel-toe stand without difficulty        Neurologic exam:  Mental status: Patient is alert and oriented with normal affect.  Attention, knowledge, memory, and language are intact.  Normal coordination throughout the examination.     Manual muscle testing : Appears to have at least antigravity strength throughout the  ankle dorsiflexors EHLs and anterior plantar flexors.             Video-Visit Details    Type of service:  Video Visit    Video End Time (time video stopped): 1320  Originating Location (pt. Location): Home    Distant Location (provider location):  [facility]     Mode of Communication:  Video Conference via [employer]      Daniel Brown DO

## 2021-06-07 NOTE — TELEPHONE ENCOUNTER
"PSP:  Dr. Brown  Last clinic visit:  OV on 2/20/2020, EMG on 3/5/2020  Reason for call: Status Update  Clinical information:  Pt calling as he had appt with neurosurgery on 4/23/2020. Surgery was recommended, however, he has to wait until they are able to do elective surgeries again due to COVID-19.   Pt inquiring what he can do to manage his pain in the meantime. He states he had been doing well with tylenol up until a few days ago when he noticed \"a lot of burning in my legs, mostly my right side\".   Advice given to patient: Advised video visit with provider to discuss management of his symptoms until he is able to get surgery.   Provider to address: DANIELA    "

## 2021-06-07 NOTE — PROGRESS NOTES
Neurosurgery consultation was requested by:   Pain: Low back  Radicular Pain is present: Right leg   Motor complaints: Weakness with pain in right leg   Sensory complaints: None   Gait and balance issues: None   Bowel or bladder issues: None   Duration of SX is: x 6 months  The symptoms are worse with: Standing   The symptoms are better with: N/A  Injury: None   Severity is: Chronic   Patient has tried the following conservative measures: Surgery 2017, PT, injections   GENARO score : 22%   Haylee Chris CMA,9:00 AM

## 2021-06-07 NOTE — PROGRESS NOTES
NEUROSURGERY CONSULTATION NOTE    4/23/2020       CHIEF COMPLAINT: Radiculopathy    HPI:    José Antonio Reynoso is a 39 y.o. male who is sent to us in consultation by Daniel Brown for further evaluation of radiculopathy. History of prior right L5-S1 discectomy 8/2017 with Dr. Disla     Onset: Recurrent pain began last fall- 2019- without obvious aggravating factor  Character: Started as a dull aggravating pain in the right buttock and will radiate down the posterior thigh. Pain is escalating slowly but surely. There are times where he is pain free but it is aggravated significantly with movement    Aggravating factors: Sitting to standing position changes, rolling over in bed, moving in bed, twisting, lifting  Relieving factors: Sitting offers relief, walking is okay once he gets going    Weakness: Denies overt weakness but is having some pain limiting weakness.  Numbness, tingling: Denies    Pain management: Tylenol, prednisone pack was prescribed but he did not take it, gabapentin- took for one month without benefit. No injections. Had been through PT in the past (2017) but it only aggravated his symptoms    Not smoking cigarettes but is vaping with nicotine    Past Medical History:   Diagnosis Date     Anxiety      Back pain      Kidney failure      Kidney stones      Low back pain      Lumbosacral disc disease      Past Surgical History:   Procedure Laterality Date     HERNIA REPAIR       LUMBAR DISCECTOMY Right 8/28/2017    Procedure: RIGHT L5-S1 MICRODISCECTOMY;  Surgeon: Raya Disla MD;  Location: [facility];  Service:        REVIEW OF SYSTEMS:  Pt denies any left sided symptoms of pain, numbness, tingling or weakness. A full 14 point review of systems was otherwise completed and is negative aside from that mentioned above in the HPI    MEDICATIONS:    Current Outpatient Medications   Medication Sig Dispense Refill     gabapentin (NEURONTIN) 100 MG capsule 1 cap at bedtime x 3 days.  Then may take 2  caps at bedtime x 3 days, then may take 3 caps at bedtime 90 capsule 2     methylPREDNISolone (MEDROL, ELLEN,) 4 mg tablet 3 tablets daily for 3 days, then 2 tablets daily for 3 days, then 1 tablet daily for 3 days then stop 18 tablet 0     predniSONE (DELTASONE) 10 mg tablet pack Take by mouth daily. Three 10 mg tablets daily for 3 days, then two 10 mg tablets for 3 days, then one 10 mg tablet for 3 days. 18 tablet 0     No current facility-administered medications for this visit.          ALLERGIES/SENSITIVITIES:     Allergies   Allergen Reactions     Ibuprofen      Kidney failure       PERTINENT SOCIAL HISTORY:   Social History     Socioeconomic History     Marital status:      Spouse name: None     Number of children: None     Years of education: None     Highest education level: None   Occupational History     None   Social Needs     Financial resource strain: None     Food insecurity     Worry: None     Inability: None     Transportation needs     Medical: None     Non-medical: None   Tobacco Use     Smoking status: Current Every Day Smoker     Packs/day: 1.00     Years: 20.00     Pack years: 20.00     Smokeless tobacco: Never Used     Tobacco comment: JUUL   Substance and Sexual Activity     Alcohol use: Yes     Comment: socially      Drug use: No     Sexual activity: None   Lifestyle     Physical activity     Days per week: None     Minutes per session: None     Stress: None   Relationships     Social connections     Talks on phone: None     Gets together: None     Attends Confucianism service: None     Active member of club or organization: None     Attends meetings of clubs or organizations: None     Relationship status: None     Intimate partner violence     Fear of current or ex partner: None     Emotionally abused: None     Physically abused: None     Forced sexual activity: None   Other Topics Concern     None   Social History Narrative     None         FAMILY HISTORY:  Family History   Problem  "Relation Age of Onset     Cancer Father         PHYSICAL EXAM:     Constitution: /80   Pulse 64   Ht 5' 9\" (1.753 m)   Wt (!) 236 lb (107 kg)   SpO2 96%   BMI 34.85 kg/m  .   Awake, alert and in NAD  Eyes: Conjugate gaze. Conjunctiva benign without icterus or injection  Heart: RRR  Lungs: Non-labored respiration without accessory muscle use  Skin: No obvious rash or lesion  Psych: Appropriate mood and affect, alert and oriented x 3  Mental Status:  Speech is fluent.  Recent and remote memory are intact.  Attention span and concentration are normal.     Cranial Nerves:  CN2: No funduscopic exam performed. CN3,4 & 6: Pupillary light response, lateral and vertical gaze normal.  No nystagmus. CN7: No facial weakness, smile, facial symmetry intact. CN8: Intact to spoken voice.      Motor: Normal bulk and tone all muscle groups of upper and lower extremities. Strength is 5/5 in all muscle groups of the bilateral lower extremities      Sensory: Sensation intact bilaterally to light touch and temperature throughout. Vibratory sense is intact in the bl great toe.     Coordination:  Gait is WNL. Pt is able to heel and toe walk without difficulty. Tandem gait is WNL and reverse tandem is excellent. FRANCES in the UE is WNL     Reflexes;  2+ patellar and left achilles. Absent right achilles.  No clonus. Toes are down-going bilaterally    Musculoskeletal: Negative straight leg raise bilaterally. Negative JAZZMINE testing. Negative Wilbert finger test    IMAGING: I personally reviewed all radiographic images    MRI lumbar spine 2/24/2020: Patient has congenitally small central canal.  Moderate disc degeneration at L4-5 and moderate to severe degeneration at L5-S1.  At L4-5, there is a broad-based paracentral disc bulge with possible midline paracentral disc herniation, there is mild bilateral lateral recess stenosis.  Moderate foraminal stenosis bilaterally.  At L5-S1, there is evidence of a prior right hemilaminectomy and " patient has evidence of a right-sided disc herniation which is causing severe lateral recess stenosis.  Additionally there is moderate to severe bilateral foraminal stenosis.    Lumbar spine flexion-extension x-rays 3/12/2020: No evidence of dynamic instability, overall lumbar lordosis is maintained.  Moderate degenerative changes noted in the upper lumbar lower thoracic discs.      EMG of the bilateral upper extremities 3/5/2020: No evidence of radiculopathy or brachial plexopathy or focal neuropathy    CONSULTATION ASSESSMENT AND PLAN:    José Antonio Reynoso is a 39 y.o. male who is an active smoker who has a recurrent right-sided L5-S1 disc herniation with signs and symptoms of sacral radiculopathy    Recommended right redo L5-S1 discectomy- we discussed the risks, benefits and alternatives to surgery including the potential for fusion. Pt wishes to proceed. Will work to schedule when possible.    Did note that if fusion surgery is needed in the future then he would need to be nicotine-free for 2 months prior to surgery    I spent more than 60 minutes in this apt, examining the pt, reviewing the scans, reviewing notes from chart, discussing treatment options with risks and benefits and coordinating care. >50 % clinic time was spent in face to face counseling and coordinating care    Jerri Young MD      Cc:   Provider, No Primary Care

## 2021-06-08 NOTE — TELEPHONE ENCOUNTER
Called patient, discussed surgery, post-op course, expectations, follow up plan.    Reviewed H&P from 5/13/2020 - cleared for surgery  Labs - WNL    MRI done on  2/24/2020 - in Nil    To OR as planned.     Check in - 0900    Nothing to eat or drink after midnight the night before surgery.     Reviewed with patient: Arrive 2.5 -3 hours prior to scheduled surgery.    Bring all pertinent films to hospital the day of surgery.     Continue to refrain from NSAIDS (Ibuprofen, Aleve, Naprosyn), ASA, Over the counter herbal medications or supplements, anti-coagulants and blood thinners.     Patient confirmed they have help/assistance in place at home upon discharge    Instructions: using a washcloth and a bottle of provided Hibiclens, wash your body, avoiding your face and genitals. Preferably, shower the night before surgery and the morning of surgery using a half a bottle each time for your whole body shower.        Patient was informed that we will provide up to 1 week prescription of pain medication for post-operative pain.      Instructed patient about the following: After your surgery, if you will be staying in-patient, a nursing provider team will be monitoring you closely throughout your stay and communicate your health status to your surgeon and other providers.  You will be seen by Advanced Practice Providers (e.g., nurse practitioners, clinic nurse specialist, and physician assistants) who will check on you regularly to assess the status of your surgery.     Dulce Maria Pozo RN, CNRN

## 2021-06-08 NOTE — PROGRESS NOTES
"José Antonio Reynoso is a 39 y.o. male who is being evaluated via a billable video visit.      The patient has been notified of following:     \"This video visit will be conducted via a call between you and your physician/provider. We have found that certain health care needs can be provided without the need for an in-person physical exam.  This service lets us provide the care you need with a video conversation.  If a prescription is necessary we can send it directly to your pharmacy.  If lab work is needed we can place an order for that and you can then stop by our lab to have the test done at a later time.    Video visits are billed at different rates depending on your insurance coverage. Please reach out to your insurance provider with any questions.    If during the course of the call the physician/provider feels a video visit is not appropriate, you will not be charged for this service.\"    Patient has given verbal consent to a Video visit? Yes    Patient would like to receive their AVS by AVS Preference: Sukhwinder.    Patient would like the video invitation sent by: Text to cell phone: [phone]    Will anyone else be joining your video visit? No        Video Start Time: 1045 AM    Additional provider notes:     Assessment/Plan:      There are no diagnoses linked to this encounter.    Assessment: Pleasant 39 y.o. male  with a history of anxiety,  kidney failure from ibuprofen as a child that resolved, and L5-S1 right lumbar microdiscectomy in 2017 with Dr. Disla with:     1.    Worsening lumbar spine and right lumbar radicular pain.  He has a recurrent right paracentral disc herniation compressing the right S1 nerve with s ongoing flare of his pain.  This is compressing the right S1 nerve in the lateral recess.  Plans for surgical intervention but on hold due to coronavirus.    Gabapentin was not helpful because drowsiness and tramadol is not effective.  He has surgery scheduled next week.     2.  " Constipation.         Discussion:    1. Tramadol was not helpful and his pain is worsening.  Gabapentin also was not helpful.  We discussed other medications to help get him through for the next week before his surgery.  We also discussed long-term management for back pain such as good core strengthening not lifting more than 50 pounds on a regular basis, good lifting mechanics and weight loss.  I highly encouraged him to lose at least 30pounds in the future.  I discussed this with the patient and his wife.    2.  We will prescribe short course of hydrocodone for the next week until his surgery.  5/325.  1 tablet 3 times daily as needed and I will give him 20 tablets.  [facility] in [place] checked and no other prescriptions.    3.  For constipation that may arise I recommend he take MiraLAX over-the-counter.    4.  Follow-up with me as needed.      It was our pleasure caring for your patient today, if there any questions or concerns please do not hesitate to contact us.      Subjective:   Patient ID: José Antonio Reynoso is a 39 y.o. male.    History of Present Illness:Patient presents with his wife today over a video visit for evaluation of increased low back pain and right leg radicular pain.  Over the past few days his pain is flared again.  It appeared a time was able to stop the tramadol and gabapentin.  Recently restarted the gabapentin but caused too much drowsiness and is now stopped and the tramadol took his last 2 tablets today and has not been helpful at controlling the pain.  Significant pain in the low back down the right leg worse with any walking better with laying down.  Also had been constipated on tramadol which seems to have improved some.    He has a surgery scheduled for microdiscectomy with Dr. Young next week in 1 week and has had his preop assessment.  Has COVID testing next week.  Wondering if he can get some medication to get through till the surgery.  Pain is a 9/10 today.  His wife also has some  general questions about spine health moving forward after surgery.      Imaging: I personally reviewed lumbar spine imaging shows a right paracentral disc herniation L5-S1 compressing the right S1 nerve in the lateral recess.    Review of Systems: No new numbness, tingling or weakness.  No bowel or bladder incontinence.  No urinary retention.  No fevers, unintentional weight loss, balance changes, headaches, frequent falling, difficulty swallowing, or coordination difficulties.      Past Medical History:   Diagnosis Date     Anxiety      Back pain      Kidney failure      Kidney stones      Low back pain      Lumbosacral disc disease        The following portions of the patient's history were reviewed and updated as appropriate: allergies, current medications, past family history, past medical history, past social history, past surgical history and problem list.           Objective:   Physical Exam:    There were no vitals filed for this visit.  There is no height or weight on file to calculate BMI.        General:  Well-appearing male in no acute distress.  Pleasant,   cooperative, and interactive throughout the examination and interview.  HEENT: Head atraumatic normocephalic, sclera clear.  Skin: No rashes or lesions seen over the face.  Respirations unlabored.  MSK: Gait is nonantalgic.  Able to heel-toe stand without difficulty.    Range of motion: Appears to have moderate decreased lumbar flexion and finger to floor secondary to right leg pain extremities:   Full shoulder abduction.     Neurologic exam: Mental status: Patient is alert and oriented with normal affect.  Attention, knowledge, memory, and language are intact.  Normal coordination throughout the examination.     Manual muscle testing :  Appears to have at least antigravity strength in the bilateral ankle dorsiflexors, EHL and anterior plantar flexors.       Video-Visit Details    Type of service:  Video Visit    Video End Time (time video stopped):  11:02 AM  Originating Location (pt. Location): Home    Distant Location (provider location):  [facility]     Platform used for Video Visit: Marcelino Brown DO

## 2021-06-08 NOTE — TELEPHONE ENCOUNTER
Chart reviewed.  Patient could take hydrocodone 4 hours apart if absolutely needed.    He is also prescribed gabapentin 100 mg previously by Dr. sloan, which he could try increasing at this time to see if he can get better benefit until surgery, he can increase this up to 3 tablets in the morning 3 in the afternoon and 3 at nighttime and it is for nerve pain.

## 2021-06-08 NOTE — ANESTHESIA CARE TRANSFER NOTE
Last vitals:   Vitals:    05/28/20 1520   BP: 128/70   Pulse: 90   Resp: 16   Temp: 37.4  C (99.4  F)   SpO2: 98%     Patient's level of consciousness is drowsy  Spontaneous respirations: yes  Maintains airway independently: yes  Dentition unchanged: yes  Oropharynx: oropharynx clear of all foreign objects    QCDR Measures:  ASA# 20 - Surgical Safety Checklist: WHO surgical safety checklist completed prior to induction    PQRS# 430 - Adult PONV Prevention: 4558F - Pt received => 2 anti-emetic agents (different classes) preop & intraop  ASA# 8 - Peds PONV Prevention: NA - Not pediatric patient, not GA or 2 or more risk factors NOT present  PQRS# 424 - Melodie-op Temp Management: 4559F - At least one body temp DOCUMENTED => 35.5C or 95.9F within required timeframe  PQRS# 426 - PACU Transfer Protocol: - Transfer of care checklist used  ASA# 14 - Acute Post-op Pain: ASA14B - Patient did NOT experience pain >= 7 out of 10

## 2021-06-08 NOTE — TELEPHONE ENCOUNTER
Discussed with covering provider Shu Stapleton CNP regarding the Gabapentin. She would like to have a video visit with patient to discuss different medication options. Phone call to pt to explain. Spoke with wife. Patient and wife agreeable. Transferred to scheduling to make appointment.

## 2021-06-08 NOTE — PROGRESS NOTES
José Antonio is here for wound check. He is s/p Redo Right Lumbar 5 - Sacral 1 hemilaminectomy, medial facetectomy with microscopic discectomy on 5/28/20 by Dr. Young. He states he has no weakness and no back pain but gets sharp shooting pain in right buttock and can go down leg after he starts walking. He has taken gabapentin but has stopped it because it was not helping him. Per RN we can send in a medrol dose pack to help calm down nerve. Pt agreed to try. He is otherwise doing well. Surgical wound WNL - CDI, no signs of infection or skin breakdown.  Incision well-healed: good skin approximation, no redness or visible/palpable edema, no tenderness to palpation.  PT. AF, denies fever, chills or sweats.  Pt. reports that the symptoms are improved from pre-op.  Zara,KOSTA

## 2021-06-08 NOTE — PATIENT INSTRUCTIONS - HE
1. Increase gabapentin slowly to 300mg thre times daily.    2. I will refill your tramadol as needed for pain    3. I will contact your surgeon to see when your surgery will be scheduled    Prescribed Gabapentin today, 100 mg tablets, to be titrated up to 3 tablets 3 times a day as tolerated for your nerve pain. Please follow Gabapentin dosing chart below.    Gabapentin 100mg Dosing Chart    DATE  MORNING AFTERNOON BEDTIME    Day 1 0 0 1    Day 2 0 0 1    Day 3 0 0 1    Day 4 1 0 1    Day 5 1 0 1    Day 6 1 0 1    Day 7 1 1 1    Day 8 1 1 1    Day 9 1 1 1    Day 10 1 1 2    Day 11 1 1 2    Day 12 1 1 2    Day 13 2 1 2    Day 14 2 1 2    Day 15 2 1 2    Day 16 2 2 2    Day 17 2 2 2    Day 18 2 2 2    Day 19 2 2 3    Day 20 2 2 3    Day 21 2 2 3    Day 22 3 2 3    Day 23 3 2 3    Day 24 3 2 3    Day 25 3 3 3    Day 26 3 3 3    Day 27 3 3 3     Continue medication, taking 3 capsules three times daily    Please call if you have any questions regarding how to take your medication  Clinic Phone # [phone]

## 2021-06-08 NOTE — TELEPHONE ENCOUNTER
ORDER FROM: Dr. Young    PRE AUTHORIZATION: No PA required; Ok to schedule    METHOD OF PATIENT CONTACT: Spoke with patient over the phone; Best number to reach him: [phone]    PROCEDURE: Redo right L5-S1 hemilaminectomy, medial facetectomy with microscopic discectomy    SURGICAL DATE: 05.28.20 @ 11:00 a.m. [facility]    READINESS VISIT: Please call    PCP, CLINIC, PHONE#: [facility]; [phone]; Pre-op physical: 05.13.20    FILM INFO: Lumbar MRI: 02.24.20 @ ; XR lumbar flex/ext: 03.12.20 @     SURGICAL LETTER: Mailed 05.18.20

## 2021-06-08 NOTE — TELEPHONE ENCOUNTER
Phone call from patient's wife with follow up questions regarding medications. Wondering what medications patient should continue to take while awaiting surgery. States he is out of Tramadol at this time; #30 given on 4/29 (5 day supply). Explained he would need to have an appointment to discuss refill of the Tramadol. Stated understanding. Assisted with scheduling the appointment for this AM.

## 2021-06-08 NOTE — ANESTHESIA PREPROCEDURE EVALUATION
Anesthesia Evaluation      Patient summary reviewed   No history of anesthetic complications     Airway   Mallampati: II  Neck ROM: full   Pulmonary - negative ROS and normal exam    breath sounds clear to auscultation  (+) a smoker  (-) shortness of breath, sleep apnea                         Cardiovascular - negative ROS and normal exam  Exercise tolerance: > or = 4 METS  (-) angina  Rhythm: regular  Rate: normal,         Neuro/Psych    (+) neuromuscular disease,  anxiety/panic attacks,     Comments: Herniated lumbar intervertebral disc    Endo/Other - negative ROS      GI/Hepatic/Renal - negative ROS           Dental - normal exam                        Anesthesia Plan  Planned anesthetic: general endotracheal    ASA 2   Induction: intravenous   Anesthetic plan and risks discussed with: patient  Anesthesia plan special considerations: antiemetics,   Post-op plan: routine recovery

## 2021-06-08 NOTE — TELEPHONE ENCOUNTER
"PSP is Dr. Brown. Had a visit with him yesterday. Was newly prescribed Hydrocodone (from Tramadol).     Patient calling to find out if he can take another Hydrocodone as \"the leg is still on fire. I am literally sweating from the pain. I took 1 Hydrocodone at 7 AM when the pain was a 9/10. It is still a 9/10.\" Patient is not taking Gabapentin due to ineffectiveness and side effect of drowsiness.     Patient is scheduled to have surgery next Thursday with Dr. Young.   "

## 2021-06-08 NOTE — TELEPHONE ENCOUNTER
PATIENT NAME:  José Antonio Reynoso  YOB: 1980  MRN: [id number]  SURGEON: Dr. Young  DATE of SURGERY: 05/28/2020  PROCEDURE: Redo right L5-S1 microscopic hemilaminotomy, medial facetectomy and discectomy    FOLLOW-UP:  Wound Check: 7-10 days  Staples/Sutures Out : n/a  Post Op Visit: 6 weeks  Post-op Provider: Telephone visit with NP  DIAGNOSTICS:  n/a  DISPOSITION:  Home same day    ADDITIONAL INSTRUCTIONS FOR MEDICAL STAFF:    Dulce Maria Pozo RN, CNRN

## 2021-06-08 NOTE — PATIENT INSTRUCTIONS - HE
~Please call Nurse Navigation line [phone] with any questions or concerns about your treatment plan, if symptoms worsen and you would like to be seen urgently, or if you have problems controlling bladder and bowel function.    Discussed the risks (eg, addiction, overdose, worsening pain) verses benefit of opioid use with patient today. Explained that this medication will not be a long term solution to ongoing pain. Discussed using lowest effective dose and the importance of other measures for pain management including PT, other non-opioid medications, behavioral treatments, and other procedure options.     For any further refills on opioid pain medication you must come in to the clinic in person to discuss further in which you may or may not receive refills.

## 2021-06-08 NOTE — ANESTHESIA POSTPROCEDURE EVALUATION
Patient: José Antonio Reynoso  Procedure(s):  Redo Right Lumbar 5 - Sacral 1 hemilaminectomy, medial facetectomy with microscopic discectomy (Right)  Anesthesia type: general    Patient location: PACU  Last vitals:   Vitals Value Taken Time   /66 5/28/2020  3:32 PM   Temp 37.4  C (99.4  F) 5/28/2020  3:20 PM   Pulse 97 5/28/2020  3:34 PM   Resp 16 5/28/2020  3:34 PM   SpO2 97 % 5/28/2020  3:34 PM   Vitals shown include unvalidated device data.  Post vital signs: stable  Level of consciousness: awake and responds to simple questions  Post-anesthesia pain: pain controlled  Post-anesthesia nausea and vomiting: no  Pulmonary: unassisted, return to baseline  Cardiovascular: stable and blood pressure at baseline  Hydration: adequate  Anesthetic events: no    QCDR Measures:  ASA# 11 - Melodie-op Cardiac Arrest: ASA11B - Patient did NOT experience unanticipated cardiac arrest  ASA# 12 - Melodie-op Mortality Rate: ASA12B - Patient did NOT die  ASA# 13 - PACU Re-Intubation Rate: ASA13B - Patient did NOT require a new airway mgmt  ASA# 10 - Composite Anes Safety: ASA10A - No serious adverse event    Additional Notes:

## 2021-06-08 NOTE — TELEPHONE ENCOUNTER
PSP:  Dr. Brown  Last clinic visit:  Video visit 5/8/2020  Reason for call: Refill of tramadol 50mg  Clinical information:  Pt calling to request refill of tramadol. Pt is scheduled for surgery on 5/28 with Dr. Young. He states he does have a few tablets left but wants to make sure he has enough to get him through until surgery. Pt states he takes 1-2 tablets 3 times daily as needed. He states he has been trying to stretch this out and take as little as possible, but this has been difficult due to constant pain. Pt wanting to discuss refill of this with provider or if there is a different medication he could try, as he feels the tramadol has not been very effective for his pain.   Advice given to patient: Advised video visit with provider to discuss. Transferred pt to scheduling to make appt.   Provider to address: DANIELA

## 2021-06-08 NOTE — PATIENT INSTRUCTIONS - HE
1.  Trial hydrocodone 1 tablet 3 times daily as needed for pain.    2.  Try MiraLAX over-the-counter    3.  Continue plan for surgery next week.

## 2021-06-08 NOTE — PROGRESS NOTES
"José Antonio Reynoso is a 39 y.o. male who is being evaluated via a billable video visit.      The patient has been notified of following:     \"This video visit will be conducted via a call between you and your physician/provider. We have found that certain health care needs can be provided without the need for an in-person physical exam.  This service lets us provide the care you need with a video conversation.  If a prescription is necessary we can send it directly to your pharmacy.  If lab work is needed we can place an order for that and you can then stop by our lab to have the test done at a later time.    Video visits are billed at different rates depending on your insurance coverage. Please reach out to your insurance provider with any questions.    If during the course of the call the physician/provider feels a video visit is not appropriate, you will not be charged for this service.\"    Patient has given verbal consent to a Video visit? Yes    Patient would like to receive their AVS by AVS Preference: Sukhwinder.    Patient would like the video invitation sent by: Text to cell phone: [phone]    Will anyone else be joining your video visit? No        Video Start Time: 1:22 PM    Video-Visit Details    Type of service:  Video Visit    Video End Time (time video stopped): 1:40  Originating Location (pt. Location): Home    Distant Location (provider location):  [facility]     Platform used for Video Visit: Marcelino Stapleton CNP    Assessment:     Diagnoses and all orders for this visit:    Lumbar radicular pain  -     oxyCODONE-acetaminophen (PERCOCET/ENDOCET) 7.5-325 mg per tablet; Take 1 tablet by mouth 4 (four) times a day as needed for pain. 1 tab every 6 hours as needed  Dispense: 24 tablet; Refill: 0    Lumbar disc herniation  -     oxyCODONE-acetaminophen (PERCOCET/ENDOCET) 7.5-325 mg per tablet; Take 1 tablet by mouth 4 (four) times a day as needed for pain. 1 tab every 6 hours as " needed  Dispense: 24 tablet; Refill: 0    History of lumbar surgery  -     oxyCODONE-acetaminophen (PERCOCET/ENDOCET) 7.5-325 mg per tablet; Take 1 tablet by mouth 4 (four) times a day as needed for pain. 1 tab every 6 hours as needed  Dispense: 24 tablet; Refill: 0       José Antonio Reynoso is a 39 y.o. y.o. male patient last seen by Dr. Brown video visit yesterday 5/21/2020 with past medical history significant for anxiety, kidney liver failure from ibuprofen as a child that resolved, L5-S1 lumbar microdiscectomy 2017 with Dr. Disla, who presents today for follow-up regarding:    -Severe lumbar spine pain with right lumbar radiculitis with minimal benefit with hydrocodone.    -Patient has surgery scheduled for next week with Dr. Young [facility] neurosurgery.     Plan:     A shared decision making plan was used. The patient's values and choices were respected. Prior medical records from 4/29/2020 to current were reviewed today. The following represents what was discussed and decided upon by the provider and the patient.        -DIAGNOSTIC TESTS: Images were personally reviewed and interpreted.   --Lumbar spine MRI shows  recurrent right paracentral disc herniation with right S1 nerve root compression.    -MEDICATIONS: Advised patient to stop hydrocodone as it was ineffective.  -Prescribed Percocet 7.5/325 mg, 1 tablet every 6 hours as needed for severe breakthrough pain, number 24 tablets given for 6 days worth as he is having surgery next week.  Did discuss with patient that he should take the lowest effective dose with this medication as taking high-dose narcotics prior to surgery can make post surgical pain more difficult to manage.  Patient and wife verbalized understanding.  Did discuss with patient that if he does take hydrocodone and Percocet together it can decrease his respiratory drive and cause him to stop breathing.  Patient verbalized understanding.  Discussed with patient he should take no more than 1  tablet every 6 hours max but he can take Tylenol 500 mg in between Percocet dosing 3 times daily as well.  Advised patient that he should take no more than a max of 3000 mg of Tylenol per day.  -MN  checked. Discussed the risks (eg, addiction, overdose, worsening pain) verses benefit of opioid use with patient today. Explained that this medication will not be a long term solution to ongoing pain. Discussed using lowest effective dose and the importance of other measures for pain management including PT, other non-opioid medications, behavioral treatments, and other procedure options.   Discussed side effects of medications and proper use. Patient verbalized understanding.    -PHYSICAL THERAPY: Encourage patient continue with home exercises specifically nerve flossing as long as he tolerates at this time prior to surgery.  Discussed the importance of core strengthening, ROM, stretching exercises with the patient and how each of these entities is important in decreasing pain.  Explained to the patient that the purpose of physical therapy is to teach the patient a home exercise program.  These exercises need to be performed every day in order to decrease pain and prevent future occurrences of pain.        -PATIENT EDUCATION:  18 minutes of total visit time was spent face to face with the patient today, 60 % of the visit was spent on counseling, education, and coordinating care.   -5 minutes spent outside of visit time, non-face-to-face time, reviewing chart.  -Today we also discussed the issues related to the current COVID-19 pandemic, the pros and cons of the current treatment plan, the CDC guidelines such as social distancing, washing the hands, and covering the cough.    -FOLLOW UP: Follow-up prior to surgery for medication management if needed.  Advised to contact clinic if symptoms worsen or change.    Subjective:     José Antonio Reynoso is a 39 y.o. male who presents today for follow-up regarding worsening right low  back pain that radiates into the right posterior thigh posterior calf that is severe and debilitating currently an 8/10, a 10 at its worst with any type of standing or walking for even a couple of minutes.  He does report that the only comfortable position right now with lying down which makes eating and going to the bathroom challenging for him.  He is currently taking hydrocodone 5/325 mg 1 tablet 3 times daily however is not even touching his pain he states.  He has taken gabapentin in the past however did make him excessively tired, he has not tried it recently.  It was not super helpful for his pain in the past however.    Treatment to Date: Medrol Dosepak 2/25/2020  Hydrocodone 5/325 mg 1 tablet every 8 hours prescribed 5/21/2020 by Dr. Brown, 20 tablets given.    Prior failed medications:  Tramadol  Hydrocodone  Gabapentin 100 mg in the past    Patient Active Problem List   Diagnosis     Herniated lumbar intervertebral disc     Right sacral radiculopathy     Lumbar disc herniation       Current Outpatient Medications on File Prior to Encounter   Medication Sig Dispense Refill     gabapentin (NEURONTIN) 100 MG capsule 1 cap at bedtime x 3 days.  Then may take 2 caps at bedtime x 3 days, then may take 3 caps at bedtime 90 capsule 2     HYDROcodone-acetaminophen 5-325 mg per tablet Take 1 tablet by mouth every 8 (eight) hours as needed for pain. 20 tablet 0     methylPREDNISolone (MEDROL, ELLEN,) 4 mg tablet 3 tablets daily for 3 days, then 2 tablets daily for 3 days, then 1 tablet daily for 3 days then stop 18 tablet 0     predniSONE (DELTASONE) 10 mg tablet pack Take by mouth daily. Three 10 mg tablets daily for 3 days, then two 10 mg tablets for 3 days, then one 10 mg tablet for 3 days. 18 tablet 0     traMADoL (ULTRAM) 50 mg tablet Take 1-2 tablets ( mg total) by mouth 3 (three) times a day as needed for pain. 30 tablet 0     No current facility-administered medications on file prior to encounter.         Allergies   Allergen Reactions     Ibuprofen      Kidney failure       Past Medical History:   Diagnosis Date     Anxiety      Back pain      Kidney failure      Kidney stones      Low back pain      Lumbosacral disc disease         Review of Systems  ROS:  Specifically negative for bowel/bladder dysfunction, balance changes, headache, dizziness, foot drop, fevers, chills, appetite changes, nausea/vomiting, unexplained weight loss. Otherwise 13 systems reviewed are negative. Please see the patient's intake questionnaire from today for details.    Reviewed Social, Family, Past Medical and Past Surgical history with patient, no significant changes noted since prior visit.     Objective:     VIRTUAL PHYSICAL EXAM:   --CONSTITUTIONAL: Well developed, well nourished, healthy appearing individual.  --PSYCHIATRIC: Appropriate mood and affect. No difficulty interacting due to temper, social withdrawal, or memory issues.  --SKIN: Lumbar region is visually dry and intact.   --RESPIRATORY: Normal rhythm and effort. No abnormal accessory muscle breathing patterns noted.   --STANDING EXAMINATION:  Normal lumbar lordosis noted, no lateral shift.  --MUSCULOSKELETAL: Lumbar spine inspection reveals no evidence of deformity. Range of motion is limited in lumbar flexion, extension, lateral rotation.   --GROSS MOTOR: Gait is non-antalgic.  Arises from seated position with some difficulty due to pain.    RESULTS:   Imaging: Lumbar spine imaging was reviewed today. The images were shown to the patient and the findings were explained using a spine model.      XR LUMBAR SPINE FLEX AND EXT 2 OR 3 VWS  LOCATION: [facility]  DATE/TIME: 3/12/2020   INDICATION: Dorsalgia, unspecified  COMPARISON: 02/24/2020.  IMPRESSION:   There are 5 lumbar type vertebral bodies. There is good anatomic alignment of the lumbar spine which is maintained on the flexion, extension and neutral views. The vertebral body heights are well-maintained  throughout. There are minimal   degenerative changes involving the lower thoracic disc spaces and the lumbar disc spaces visualized on this study. There is facet arthropathy from L4 to the sacrum. These studies have a slight loss of disc space height along with endplate changes.      MR LUMBAR SPINE WO CONTRAST  LOCATION: [facility]  DATE/TIME: 2/24/2020   INDICATION: Uncomplicated low back pain or radiculopathy, < 6 wks, unspecified. Prior surgery, new symptoms   COMPARISON: 07/26/2017  TECHNIQUE: Without IV contrast  FINDINGS:   Nomenclature is based on 5 lumbar type vertebral bodies. Satisfactory vertebral body height. 2 mm posterior subluxation L5 upon S1 with interspace narrowing L4-L5 and L5-S1 levels. Normal distal spinal cord and cauda equina with conus medullaris at L1.   No extraspinal abnormality. Mild degenerative changes anterior SI joints bilaterally.  T12-L1: Normal disc height and signal. No herniation. Normal facets. No spinal canal or neural foraminal stenosis.   L1-L2: Normal disc height and signal. No herniation. Normal facets. No spinal canal or neural foraminal stenosis.  L2-L3: Annular bulge. No disc herniation. Facet joints are normal. No spinal canal or foraminal narrowing.   L3-L4: Annular bulge with mild loss of disc height. Shallow broad-based central disc protrusion. Facet joints are normal. Mild spinal canal narrowing without foraminal stenosis.  L4-L5: Mild loss of disc height with generalized disc bulge. Superpose broad-based central disc extrusion with annular tear/disruption. Mild spinal canal narrowing. Facet joints are normal. Mild to moderate bilateral foraminal narrowing.  L5-S1: Moderate loss of disc height. Posterior subluxation. Broad-based superpose right paracentral foraminal disc extrusion. Mass effect upon exiting right L5 and traversing right S1 nerve root could result in mixed radiculopathy in this distribution. Facet joints are normal. No spinal canal  narrowing. Mild encroachment right S1 subarticular lateral recess. Mild to moderate bilateral foraminal narrowing right more than left.  IMPRESSION:   1.  Degenerative changes lower lumbar spine redemonstrated.  2.  Since the prior study, there has been some progressive degenerative changes at the L4-L5 and L5-S1 level. Annular tear L4-L5 is redemonstrated and appears similar. Disc extrusion to the right of midline L5-S1 appears slightly larger with increased   mass effect upon the traversing right S1 nerve root. No other significant change.

## 2021-06-08 NOTE — PROGRESS NOTES
"José Antonio Reynoso is a 39 y.o. male who is being evaluated via a billable video visit.      The patient has been notified of following:     \"This video visit will be conducted via a call between you and your physician/provider. We have found that certain health care needs can be provided without the need for an in-person physical exam.  This service lets us provide the care you need with a video conversation.  If a prescription is necessary we can send it directly to your pharmacy.  If lab work is needed we can place an order for that and you can then stop by our lab to have the test done at a later time.    Video visits are billed at different rates depending on your insurance coverage. Please reach out to your insurance provider with any questions.    If during the course of the call the physician/provider feels a video visit is not appropriate, you will not be charged for this service.\"    Patient has given verbal consent to a Video visit? Yes    Patient would like to receive their AVS by AVS Preference: Sukhwinder.    Patient would like the video invitation sent by: Text to cell phone: [phone]    Will anyone else be joining your video visit? No        Video Start Time: 9:02 AM    Additional provider notes:    Assessment/Plan:      Diagnoses and all orders for this visit:    Lumbar disc herniation  -     traMADoL (ULTRAM) 50 mg tablet; Take 1-2 tablets ( mg total) by mouth 3 (three) times a day as needed for pain.  Dispense: 30 tablet; Refill: 0    Lumbar radicular pain  -     traMADoL (ULTRAM) 50 mg tablet; Take 1-2 tablets ( mg total) by mouth 3 (three) times a day as needed for pain.  Dispense: 30 tablet; Refill: 0    Drug-induced constipation        Assessment: Pleasant 39 y.o. male ith a history of anxiety,  kidney failure from ibuprofen as a child that resolved, and L5-S1 right lumbar microdiscectomy in 2017 with Dr. Disla with:    1.  Recurrent increase in lumbar spine and right lumbar radicular " pain.  He has a recurrent right paracentral disc herniation compressing the right S1 nerve with severe flare of his pain over the past 2 days.  This is compressing the right S1 nerve in the lateral recess.  Plans for surgical intervention but on hold due to coronavirus.  Was tolerable on tramadol 1 to 2 tablets 3 times daily and had increased gabapentin slightly.  We discussed medication options    2.  Constipation.    Discussion:    1.  I discussed the plan with him.  Right now the plan is for him to continue to wait for surgery and continue with pain management.  We discussed the option of  lumbar epidural but he would like to continue with medications until he can have surgery.    2.  Increase gabapentin slowly to 300 mg 3 times daily and we can increase it from there as well if needed.  Dosage chart provided for the patient.    3.  I will provide further tramadol prescription for him.  50 to 100 mg 3 times daily as needed for pain.  30 tablets provided.   checked and appropriate    4.   I have contacted  neurosurgery clinic to see what the plan is for is surgery.  It is  increasingly becoming more intrusive on his life difficult for him to work my hope is that they can move this up.    5.  He can try over-the-counter fiber or MiraLAX to help with constipation.      6. Follow-up with me as needed.      It was our pleasure caring for your patient today, if there any questions or concerns please do not hesitate to contact us.      Subjective:   Patient ID: José Antonio Reynoso is a 39 y.o. male.    History of Present Illness: Patient presents for follow-up of low back pain right gluteal pain lower extremity radicular pain.  Symptoms have worsened since last visit.  At last visit was given some tramadol took 50 mg in the morning and 50 to 100 mg in the afternoon at bedtime to help with his pain and I did take the edge off but caused constipation.  Also increase gabapentin to 100 mg in the morning and afternoon and 300 mg  at bedtime.  While he was on these medications symptoms became somewhat tolerable but he is out of tramadol.  He still taking gabapentin and tolerating that well and feels that it might be helpful no side effects.    His pain is across the lumbar spine to the right gluteal region worse with any standing and walking.  He gets a sharp shooting pain down his right leg.  Better with laying down.  His pain is now intruding on his ability to work.  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  He tells me he has contacted neurosurgery but still unknown when his surgery is going to take place.  He is rating his pain 8/10 today it is difficult for him to even take care of his 2-year-old daughter.      Imaging: I personally reviewed the MRI images of the lumbar spine showing right paracentral disc herniation at L5-S1 compressing the S1 nerve in the lateral recess.    Review of Systems: Complaining of pain worse in the evening no new weakness or paresthesias in the legs.  No headaches.  No bowel or bladder incontinence although he was constipated.  No fevers or unintentional weight loss.           Past Medical History:   Diagnosis Date     Anxiety      Back pain      Kidney failure      Kidney stones      Low back pain      Lumbosacral disc disease        The following portions of the patient's history were reviewed and updated as appropriate: allergies, current medications, past family history, past medical history, past social history, past surgical history and problem list.           Objective:   Physical Exam:    There were no vitals filed for this visit.  There is no height or weight on file to calculate BMI.      General:  Well-appearing male in no acute distress.  Pleasant,   cooperative, and interactive throughout the examination and interview.  HEENT: Head atraumatic normocephalic, sclera clear.  Skin: No rashes or lesions seen over the face.  Respirations unlabored.  MSK: Nonantalgic movements         Neurologic exam: Mental status: Patient is alert and oriented with normal affect.  Attention, knowledge, memory, and language are intact.  Normal coordination throughout the examination.      Manual muscle testing: Able to heel toe stand without difficulty appears to have at least antigravity strength EHL, ankle plantar flexors and dorsiflexors.        Video-Visit Details    Type of service:  Video Visit    Video End Time (time video stopped): 9:27 AM  Originating Location (pt. Location): Home    Distant Location (provider location):  [facility]     Platform used for Video Visit: Marcelino Brown DO

## 2021-06-09 NOTE — PROGRESS NOTES
"This patient is being evaluated via a billable telephone visit.      The patient has been notified of following:     \"This telephone visit will be conducted via a call between you and your physician/provider. We have found that certain health care needs can be provided without the need for a physical exam.  This service lets us provide the care you need with a short phone conversation.  If a prescription is necessary we can send it directly to your pharmacy.  If lab work is needed we can place an order for that and you can then stop by our lab to have the test done at a later time.    Telephone visits are billed at different rates depending on your insurance coverage. During this emergency period, for some insurers they may be billed the same as an in-person visit.  Please reach out to your insurance provider with any questions.    If during the course of the call the physician/provider feels a telephone visit is not appropriate, you will not be charged for this service.\"    Patient has given verbal consent to a Telephone visit? Yes    Reason for visit:  6wk s/p Redo Right Lumbar 5 - Sacral 1 hemilaminectomy, medial facetectomy with microscopic discectomy on 5/28/20 by Dr. Young.     Last visit c/o sharp shooting pain in his right buttock into the leg with walking. He had stopped gabapentin and was sent a medrol dose pack.    Subjective:  Leg pain is gone. Numbness of R glueal region is gone. Stopped about two weeks after he was off of the medrol dose pack. He has mild soreness in his lower back. Incision looks good. No leg weakness or numbness. He is a  and feels like he is not quite ready to return to work without restriction.    Diagnosis:  Lumbar radiculopathy    Assessment/Plan:  Radicular leg pain is gone. No issues with incision. Reassured pt that soreness in his back should continue to improve. Can take tylenol (allergy to NSAIDs) and start PT.  Continue lifting restriction of 20 lbs and " allow PT to clear further activity restrictions  Plan to follow up 12 wks postop for telephone visit with NP.    [facility].    I have reviewed and updated the patient's Past Medical History, Social History, Family History and Medication List.  Patient's allergies were reviewed.    Phone call duration: 15 minutes    Jenn LING-LIZY  [facility]  O. [phone]

## 2021-06-11 NOTE — PROGRESS NOTES
Assessment/Plan:    Diagnoses and all orders for this visit:    Right lumbar radiculitis  -     HYDROcodone-acetaminophen (NORCO) 5-325 mg per tablet; Take 1 tablet by mouth every 6 (six) hours as needed for pain.  Dispense: 28 tablet; Refill: 0  -     gabapentin (NEURONTIN) 300 MG capsule; 1 tab PO qhs for 3-5 days, then 1 tab BID for 3-5 days, then 1 tab TID  Dispense: 90 capsule; Refill: 1  -     methylPREDNISolone (MEDROL DOSEPACK) 4 mg tablet; follow package directions  Dispense: 1 tablet; Refill: 0    Lumbago  -     HYDROcodone-acetaminophen (NORCO) 5-325 mg per tablet; Take 1 tablet by mouth every 6 (six) hours as needed for pain.  Dispense: 28 tablet; Refill: 0        Discussion:  This is a 37 y.o. healthy male who presented today for a new patient evaluation of right-sided lower back and radicular posterior thigh pain.  Patient is neurologically intact and no focal weakness.  He does not have any red flag symptoms.  His right-sided back pain and radicular pain is more likely on the S1 nerve tract palpable caused by disc herniation.  He suffers from chronic lower back pain that was controlled by physical therapy until 3 days ago at severe back and right posterior leg from lifting a boat.  He is tender over the right SI joint that could be SI joint dysfunction due to severe back pain.  Patient has a history of smoking who is on Chantix.  I will place order for MRI of the lumbar spine for further evaluation.  We will give the patient Norco for 1 week while on oral steroids.  Also, the patient gabapentin to help her with her pain.  I recommend patient continue with home exercise.  We will have the patient follow-up better in a week.      GENARO Score: 64  WHO 5: 25    PLAN:    This was a shared treatment plan.  Patient fully understands the nature of the problem.      DIAGNOSTIC:  MRI lumbar was ordered at the [facility].  We will call the patient for the results.    INTERVENTIONAL PAIN MANAGEMENT:    If  the patient is benefit from oral steroids, he may benefit from a right S1-S2 TFESI.    PHYSICAL THERAPY AND CHIROPRACTIC CARE:    Recommend physical therapy if he does not get better with oral steroids.  We will have the patient continue with home exercises 5 times a week.    MEDICATIONS:    I have checked the .  Patient has no its on the .  Prescription of 5/325 mg Norco No. 28 to be taking 1 tablet 4 times a day as needed for pain for 1 week.    4 mg Medrol Dosepak is prescribed.    300 mg gabapentin prescribed to take at therapeutic dose over a slow titrating up to 900 mg, 1 tablet 3 times a day, in a course 9-15 days.        HOME INSTRUCTION:     I advised the patient to not to lift anything 10-20 pounds for do excessive bending or reaching.    PATIENT EDUCATION:  Educated the patient on his condition and treatment plan.    The patient is in agreement with the above plan. All questions were answered.    FOLLOW-UP:   Patient will follow up in 1 week if symptoms does not improve.    40 minutes of total visit time was spent face to face with the patient today 60% of the visit was spent on counseling, education, and coordinating care.     This note has been dictated using voice recognition software. Any grammatical or context distortions are unintentional and inherent to the software         Jenniffer Neumann DC, SARAH, PASungC         History of Present Ilness:   José Antonio Reynoso is a 37 y.o. male accompanied by wife to [facility] for an initial evaluation of right-sided lower back pain and buttock pain upon the requests of the patient.  He reports that he has been suffering from right-sided lower back pain radiated to the right but since January where he so several providers he was treated with Vicodin and physical therapy for about 4 months.  His PCP put in referral for MRI of the lower back, but patient did not obtain it.  His pain was controlled with home exercises until 3 days ago where he was  helping a friend the [employer] boat.  He immediately felt right shoulder pain that has subsided.  Yesterday, he started having some gradual pain of the right side the back that progressed to severe pain to the buttocks and posterior thigh this morning.  He reports that he feels some stiffness and burning pain which he rates it a 10/10, today.  The pain is worse and different than the initial flareup in January.  The pain is worse in the morning, sitting, standing, bending, and twisting.  There is no particular position or makes the pain better.  He denies of any urinary/bowel incontinence.  He denies of any paresthesia or weakness in his right leg.  He reports that when his pain is severe in the right buttocks and thigh that he feels like his leg is weak.  He tried a muscle relaxant that was prescribed by PCP and did not help.  He tried some ice and stretching exercises without any help.    PAST MEDICAL HISTORY:    Unremarkable    No past surgical history on file.    No current outpatient prescriptions on file prior to encounter.     No current facility-administered medications on file prior to encounter.        FAMILY MEDICAL HISTORY:  Father-cancer    SOCIAL HISTORY:   Social History   Substance Use Topics     Smoking status: Current Every Day Smoker     Packs/day: 1.00 22 years      Smokeless tobacco: None     Alcohol use None       REVIEW OF SYMPTOMS:  Constitutional: He denies of any fever, night sweats, unexplained weight loss.  Difficult sleeping.  Eyes: Negative  Respiratory: Negative  Cardiovascular: Negative  Gastrointestinal: Negative  Genitourinary: Negative  Musculoskeletal: Negative  Skin: Negative  Neurological: Negative  Psychological: Negative  All other systems reviewed and are negative.    EVALUATION TO DATE:    No imaging of the lower back.    TREATMENT TO DATE:   1.  Patient has not had any physical therapy for this current flareup.  2.  He did 4 months of physical therapy with some relief.  3.   No injection of the lower back or chiropractic treatments    Objective:   CONSTITUTIONAL: obese. Not in acute distress.  PSYCHIATRIC:  Judgment and insight are intact.  Alert and oriented.  Mood and affect are appropriate.  HEAD: NC/AT  EYES: clear conjunctiva  MUSCULOSKELETAL:  Gait: ambulatory. right limping gait. Able to heel and toe raise without difficulties.   Motor Volition:able to go from a sitting to standing position and sitting on the table without difficulities.  Lumbar Spine: Foot evertors 5/5, Foot invertors  5/5, Foot dorsiflexor  5/5, Foot plantarflexors  5/5, Leg extensors  5/5, Leg flexors  5/5, Hip abductor  5/5, Hip adductor  5/5. Straight leg Raise is positive on the right at 45  causes lower back pain and right leg pain. Leg lift test is negative. Palpatory tenderness of the right L4-L5 and L5-S1 paraspinal, right SI joint.   Hip: Keven Sea Test is positive on the right causes lower back pain. Yeoman Test is negative. Nachlas' Test negative.Hip ROM is full without pain. No tenderness of posterior thigh or calf.  NEUROLOGIC:  Bakinski test is negative. Deep tendon reflexes of Patella 2/4 and Achilles  2/4 and symmetrical.  L3-S1 dermatomes are intact. Muscle tone is normal. Clonus is negative.  LYMPHATIC: No groin adenopathy.   SKIN:  No lesion of the epidermis or subcutaneous tissue of the lumbar spine.  RESPIRATORY:  Effort is normal.  GASTROINTESTINAL: round habitus.Soft and nontender in all four quadrants. No palpable masses felt.

## 2021-06-12 NOTE — PROGRESS NOTES
Assessment / Plan:     Diagnoses and all orders for this visit:    Lumbalgia  -     HYDROcodone-acetaminophen 5-325 mg per tablet; Take 0.5 - 1 tablet BID prn for pain. Max 2 per day.  Dispense: 40 tablet; Refill: 0  -     Cancel: OPS TFESI Lumbar Sacral Unilateral; Future; Expected date: 8/3/17  -     OPS TFESI Lumbar Sacral Unilateral; Future; Expected date: 8/3/17    Lumbar radiculitis  -     HYDROcodone-acetaminophen 5-325 mg per tablet; Take 0.5 - 1 tablet BID prn for pain. Max 2 per day.  Dispense: 40 tablet; Refill: 0  -     Cancel: OPS TFESI Lumbar Sacral Unilateral; Future; Expected date: 8/3/17  -     OPS TFESI Lumbar Sacral Unilateral; Future; Expected date: 8/3/17    Lumbar disc herniation  -     HYDROcodone-acetaminophen 5-325 mg per tablet; Take 0.5 - 1 tablet BID prn for pain. Max 2 per day.  Dispense: 40 tablet; Refill: 0  -     Cancel: OPS TFESI Lumbar Sacral Unilateral; Future; Expected date: 8/3/17  -     OPS TFESI Lumbar Sacral Unilateral; Future; Expected date: 8/3/17    Myofascial pain  -     HYDROcodone-acetaminophen 5-325 mg per tablet; Take 0.5 - 1 tablet BID prn for pain. Max 2 per day.  Dispense: 40 tablet; Refill: 0  -     Cancel: OPS TFESI Lumbar Sacral Unilateral; Future; Expected date: 8/3/17  -     OPS TFESI Lumbar Sacral Unilateral; Future; Expected date: 8/3/17    Sleep disturbance  -     HYDROcodone-acetaminophen 5-325 mg per tablet; Take 0.5 - 1 tablet BID prn for pain. Max 2 per day.  Dispense: 40 tablet; Refill: 0  -     Cancel: OPS TFESI Lumbar Sacral Unilateral; Future; Expected date: 8/3/17  -     OPS TFESI Lumbar Sacral Unilateral; Future; Expected date: 8/3/17          José Antonio Reynoso is a 37 y.o. y.o. male with no past medical history,  who presents today for follow-up regarding chronic right-sided low back pain, with right radicular pain to the right gluteal, right posterior thigh, that is likely due to the right paracentral disc protrusion at the L5-S1 that is mildly  encroaches on the right lateral recess,  and displaces and impinges on the traversing right S1.  Patient had 5 months of physical therapy and medication without significant pain relief.  I recommend patient to proceed with right L5-S1 transforaminal epidural steroid injection.  No red flags    A shared decision making plan was used.  The patient's values and choices were respected.  The following represents what was discussed and decided upon by the physician and the patient.      1.  DIAGNOSTIC TESTS: I reviewed the lumbar MRI imaging and the report with the patient today.  He verbalizes understanding.  2.  PHYSICAL THERAPY: Patient will continue on physical therapy [facility] program.  3.  MEDICATIONS: Patient will increase gabapentin 300 mg gradually in to 2 tablets 3 times daily.  I provided patient with a refill on hydrocodone acetaminophen 5-3 25 mg to be taken half a tablet to 1 tablet twice daily as needed for pain.  Side effects of the medication discussed with the patient today.  4.  INTERVENTIONS: Right L5-S1 transforaminal epidural steroid injection.  5.  PATIENT EDUCATION: I thoroughly discussed the plan with the patient today.  He verbalizes understanding and agrees with the plan.  6.  FOLLOW-UP: Patient will follow up with me in 2  weeks.  All questions were answered.      DANN Krueger, MPA-C      Subjective:     José Antonio Reynoso is a 37 y.o. male who presents today for follow-up regarding right-sided low back pain, and radicular pain to the posterior gluteal posterior thigh.  Patient symptoms has been present since January 2017 after lifting a boat back then.  Patient stated that he attended physical therapy for 5 months with minimal pain relief.  At this time he reports 6 out of 10 intensity pain in the right lower back radiating to the right posterior gluteal posterior thigh.  Symptoms are aggravated simply by sitting in his vehicle driving, standing, walking and rates it at 8 out of 10 intensity on  its worst.  His symptoms are mildly elevated by taking hydrocodone acetaminophen 5-3 25 mg, 1/2 - 1 tablet daily.  He has been taking gabapentin 300 mg 1 tablet 3 times daily without significant nerve pain relief.    Today patient's lumbar MRI shows superimposed moderate-sized shallow right paracentral disc protrusion that moderately encroaches in the right lateral recess and mildly displaces the traversing right S1 nerve root.Patient denies urinary or bowel incontinence, unintentional weight loss, saddle anesthesia, fever or chills, balance difficulties.      Review of Systems:  Right-sided low back pain radiating to the right lower extremity. Patient denies urinary or bowel incontinence, unintentional weight loss, saddle anesthesia, fever or chills, balance difficulties.       Objective:   CONSTITUTIONAL:  Vital signs as above.  No acute distress.  The patient is well nourished and well groomed.    PSYCHIATRIC:  The patient is awake, alert, oriented to person, place and time.  The patient is answering questions appropriately with clear speech.  Normal affect.  SKIN:  Skin over the face, posterior torso, bilateral upper and lower extremities is clean, dry, intact without rashes.  MUSCULOSKELETAL:  Gait is non-antalgic.  The patient is able to heel and toe walk without any difficulty.  Mild tenderness over the right L5-S1 lumbar paraspinal muscles.      The patient has 5/5 strength for the bilateral hip flexors, knee flexors/extensors, ankle dorsiflexors/plantar flexors, ankle evertors/invertors.    NEUROLOGICAL:  2/4 patellar, medial hamstring, achilles reflexes which are symmetric bilaterally.  No ankle clonus bilaterally.  Sensation to light touch is intact in the bilateral L4, L5, and S1 dermatomes.       RESULTS:      MR LUMBAR SPINE WO CONTRAST  7/26/2017 9:03 AM      INDICATION: Lower back pain and right hip pain, radiculopathy.  TECHNIQUE: Unenhanced.  COMPARISON: None.     FINDINGS: 5 lumbar vertebrae are  assumed.      T12-L1: Normal disc signal and height. Small chronic Schmorl's node inferior endplate T12. Facet joints negative. No significant central canal or foraminal stenosis.     L1-L2: Normal disc signal and height. Facet joints negative. No significant central canal or foraminal stenosis.     L2-L3: Normal disc signal and height. Facet joints negative. No significant central canal or foraminal stenosis.     L3-L4: Normal disc signal and height. Mild annular bulge. Minimal degenerative facet arthropathy. No significant central canal stenosis. Mild bilateral foraminal stenosis. Facet joints negative. No significant central canal or foraminal stenosis.     L4-L5: Disc dehydration. Slight disc space loss. Annular bulge. Small posterior annular tear. Minimal degenerative facet arthropathy. No significant central canal stenosis. Mild to moderate bilateral foraminal stenosis.     L5-S1: Disc dehydration. Mild disc space loss. Annular bulge. There is a superimposed moderate-sized shallow right paracentral disc protrusion which moderately encroaches on the right lateral recess and mildly displaces the traversing right S1 nerve   root. Facet joints negative. Moderate bilateral foraminal stenosis.     Conus and intraspinal contents otherwise negative. No significant marrow signal abnormality. Developmentally short pedicles throughout the lumbar spine predisposes to central canal and foraminal stenosis. Degenerative change right SI joint. No   significant paravertebral soft tissue abnormality.     IMPRESSION:   CONCLUSION:  1.  Mild degenerative changes in the lower lumbar spine.  2.  At L5-S1 there is a moderate sized broad-based right paracentral disc protrusion which moderately encroaches on the right lateral recess and displaces and likely impinges on the traversing right S1 nerve root. There is moderate bilateral foraminal   stenosis at this level.  3.  At L4-L5 there is mild to moderate bilateral foraminal  stenosis.  4.  At L3-L4 there is mild bilateral foraminal stenosis.

## 2021-06-12 NOTE — ANESTHESIA POSTPROCEDURE EVALUATION
Patient: José Antonio Reynoso  RIGHT L5-S1 MICRODISCECTOMY  Anesthesia type: general    Patient location: PACU  Last vitals:   Vitals:    08/28/17 1815   BP: 145/80   Pulse: 74   Resp: 12   Temp: 36.9  C (98.5  F)   SpO2: 96%     Post vital signs: stable  Level of consciousness: awake and responds to simple questions  Post-anesthesia pain: pain controlled  Post-anesthesia nausea and vomiting: no  Pulmonary: unassisted, return to baseline  Cardiovascular: stable and blood pressure at baseline  Hydration: adequate  Anesthetic events: no    QCDR Measures:  ASA# 11 - Melodie-op Cardiac Arrest: ASA11B - Patient did NOT experience unanticipated cardiac arrest  ASA# 12 - Melodie-op Mortality Rate: ASA12B - Patient did NOT die  ASA# 13 - PACU Re-Intubation Rate: ASA13B - Patient did NOT require a new airway mgmt  ASA# 10 - Composite Anes Safety: ASA10A - No serious adverse event  ASA# 38 - New Corneal Injury: ASA38A - No new exposure keratitis or corneal abrasion in PACU    Additional Notes:

## 2021-06-12 NOTE — PROGRESS NOTES
NEUROSURGERY CONSULTATION NOTE:    Assessment:  Severe intractable radiculopathy related to HNP at right L5S1    Plan: He has a large herniated disc on the right at L5-S1.  I have advised surgery due to his intractability.  He has had physical therapy, injections and time.  I explained the risks and benefits of minimally invasive removal of the herniated disc.  I discussed the risk of infection, bleeding and persistent neurological symptoms.  He has discussed this with his wife and they would like to proceed.  We will go ahead and submit the orders.    José Antoniolorna Reynoso   [address] [place] [id number]  37 y.o. male is sent to me in consultation   by NIYA Miller     CC:    Chief Complaint   Patient presents with     Back Pain       HPI:  Neurosurgery consultation was requested by: Lesa Ansari   Pain: Lower back , right side   Radicular Pain is present: radiates into right buttock   Lhermitte sign: NO  Motor complaints: Minor weakness  Sensory complaints: Denies numbness and tingling   Gait and balance issues: Yes  Bowel or bladder issues: Denies   Duration of SX is: 1/2017  The symptoms are worse with: Sitting, bending, and walking   The symptoms are better with: Laying down   Injury: Denies   Severity is: Moderate  Patient has tried the following conservative measures: Pt did PT for 5 months and did not help. Pt had a Right L5-S1 injection and had immediate relief but only last about a hour or so    PROBLEM LIST:  Intractable right lower extremity radiculopathy due to herniated disc    REVIEW OF SYSTEMS:  A 12 point review of systems is negative other than those symptoms listed above      Past Medical History:   Diagnosis Date     Kidney failure          Past Surgical History:   Procedure Laterality Date     HERNIA REPAIR           MEDICATIONS:  Current Outpatient Prescriptions   Medication Sig Dispense Refill     gabapentin (NEURONTIN) 300 MG capsule 1 tab PO qhs for 3-5 days, then 1 tab BID for 3-5 days, then  "1 tab TID 90 capsule 1     HYDROcodone-acetaminophen 5-325 mg per tablet Take 1-2 tablets by mouth every 6 (six) hours as needed for pain. 30 tablet 0     varenicline (CHANTIX) 0.5 MG tablet Take 0.5 mg by mouth 2 (two) times a day.       No current facility-administered medications for this visit.          ALLERGIES/SENSITIVITIES:     Allergies   Allergen Reactions     Ibuprofen      Kidney failure       PERTINENT SOCIAL HISTORY:   Social History     Social History     Marital status:      Spouse name: N/A     Number of children: N/A     Years of education: N/A     Social History Main Topics     Smoking status: Current Every Day Smoker     Packs/day: 1.00     Smokeless tobacco: None     Alcohol use None     Drug use: None     Sexual activity: Not Asked       FAMILY HISTORY:  Family History   Problem Relation Age of Onset     Cancer Father         PHYSICAL EXAM:   Constitutional: /69  Pulse 60  Ht 5' 9.5\" (1.765 m)  Wt (!) 232 lb (105.2 kg)  BMI 33.77 kg/m2    General appearance: Appropriately groomed.  In acute distress.  Interactive.     Mental Status: Mental status: Alert and oriented, mood and affect appropriate, language reception and expression normal, recent and remote memory is normal, higher cortical function normal. Attention span, concentration and ability to follow commands is normal.       Cranial Nerves: Face is symmetric.  Extraocular movements are full, conjugate and without nystagmus.  Hearing is preserved.  Shoulder position is symmetric.  Tongue is midline with normal motion.       Motor: Motor exam nl bilateral UE. Tone nl, bulk nl and strength 5/5 all groups.      Sensory: Sensory exam by subjective report intact to LT,PP,Position and Vib. in the UE and  LE.     Station and Gait: Antalgic gait     Reflexes; reduced right ankle jerk    IMAGING:  I have personally reviewed the images and discussed the findings with José Antonio Reynoso.  He has a large herniated disc at L5-S1 on the right. "  This is compressing the right S1 nerve root and lateral recess.    CC:     NIYA Krueger- [facility] [place] [address]

## 2021-06-12 NOTE — PROGRESS NOTES
Neurosurgery consultation was requested by: Lesa Ansari   Pain: Lower back , right side   Radicular Pain is present: radiates into right buttock   Lhermitte sign: NO  Motor complaints: Minor weakness  Sensory complaints: Denies numbness and tingling   Gait and balance issues: Yes  Bowel or bladder issues: Denies   Duration of SX is: 1/2017  The symptoms are worse with: Sitting, bending, and walking   The symptoms are better with: Laying down   Injury: Denies   Severity is: Moderate  Patient has tried the following conservative measures: Pt did PT for 5 months and did not help. Pt had a Right L5-S1 injection and had immediate relief but only last about a hour or so  GENARO score is:  KOSTA Zuñiga

## 2021-06-12 NOTE — PROGRESS NOTES
José Antonio Reynoso is status post Right L5-S1 HLMD on 8/28/2017 by Dr. Raya Disla.  Preoperatively presented with right lower extremity radiculopathy including pain, numbness and weakness worse with positional irritation.  Today he is here for a wound check. He is accompanied by his SO. He is very pleased with his outcome - his leg pain is gone, his back discomfort is minimal. Denies sensory or motor disturbance in LE. Gait and balance are normal.      Surgical wound WNL - CDI, no signs of infection or skin breakdown.  Incision well-healed: good skin approximation, no redness or visible/palpable edema, no tenderness to palpation.  PT. AF, denies fever, chills or sweats.  Pt. reports that the symptoms are improved from pre-op.

## 2021-06-12 NOTE — PROGRESS NOTES
Assessment / Plan:     Diagnoses and all orders for this visit:    Lumbalgia  -     Ambulatory referral to Neurosurgery  -     HYDROcodone-acetaminophen 5-325 mg per tablet; Take 1-2 tablets by mouth every 6 (six) hours as needed for pain.  Dispense: 30 tablet; Refill: 0    Lumbar radiculitis  -     Ambulatory referral to Neurosurgery  -     HYDROcodone-acetaminophen 5-325 mg per tablet; Take 1-2 tablets by mouth every 6 (six) hours as needed for pain.  Dispense: 30 tablet; Refill: 0    Lumbar disc herniation  -     Ambulatory referral to Neurosurgery  -     HYDROcodone-acetaminophen 5-325 mg per tablet; Take 1-2 tablets by mouth every 6 (six) hours as needed for pain.  Dispense: 30 tablet; Refill: 0    Myofascial pain  -     Ambulatory referral to Neurosurgery  -     HYDROcodone-acetaminophen 5-325 mg per tablet; Take 1-2 tablets by mouth every 6 (six) hours as needed for pain.  Dispense: 30 tablet; Refill: 0    Sleep disturbance  -     HYDROcodone-acetaminophen 5-325 mg per tablet; Take 1-2 tablets by mouth every 6 (six) hours as needed for pain.  Dispense: 30 tablet; Refill: 0          José Antonio Reynoso is a 37 y.o. y.o. male who presents today for follow-up regarding  chronic right-sided low back pain, with right radicular pain to the right gluteal, right posterior thigh, that is likely due to the right paracentral disc protrusion at the L5-S1 that is mildly encroaches on the right lateral recess,  and displaces and impinges on the traversing right S1. Patient underwent right L5-S1 transforaminal epidural steroid injection on August 14 of 2017 without any pain relief.  Patient underwent 5 months of physical therapy without any pain relief.  Patient has been taking hydrocodone-acetaminophen 5-3 25 mg without any pain relief.  I  Recommend patient to consult with neurosurgery to discuss surgical treatment.        A shared decision making plan was used.  The patient's values and choices were respected.  The following  represents what was discussed and decided upon by the physician and the patient.      1.  DIAGNOSTIC TESTS: I reviewed the lumbar MRI imaging and the report with the patient today.  He verbalizes understanding.  2.  PHYSICAL THERAPY: Patient will continue on physical therapy [facility] program.  3.  MEDICATIONS: Patient will continue on hydrocodone-acetaminophen 5-3 25 mg 1-2 tablets every 6 hours as needed for severe pain.  Patient is unable to take ibuprofen due to kidney problems.  4.  INTERVENTIONS: No therapeutic injections.   5.  PATIENT EDUCATION: I thoroughly discussed the plan with the patient today.  He verbalizes understanding and agrees with the plan.  6.  FOLLOW-UP: Patient will follow up with me in 4  weeks.  All questions were answered.      DANN Krueger, MPA-C      Subjective:     José Antonio Reynoso is a 37 y.o. male who presents today for follow-up regarding right-sided low back pain, and radicular pain to the posterior gluteal posterior thigh.     He continues to reports 8 out of 10 intensity pain in the right lower back radiating to the right lower extremity.  Hislumbar MRI shows superimposed moderate-sized shallow right paracentral disc protrusion that moderately encroaches in the right lateral recess and mildly displaces the traversing right S1 nerve root. Patient denies urinary or bowel incontinence, unintentional weight loss, saddle anesthesia, fever or chills, balance difficulties.      Review of Systems:  Right-sided low back pain radiating to the right lower extremity. Patient denies urinary or bowel incontinence, unintentional weight loss, saddle anesthesia, fever or chills, balance difficulties.       Objective:   CONSTITUTIONAL:  Vital signs as above.  No acute distress.  The patient is well nourished and well groomed.    PSYCHIATRIC:  The patient is awake, alert, oriented to person, place and time.  The patient is answering questions appropriately with clear speech.  Normal affect.  SKIN:   Skin over the face, posterior torso, bilateral upper and lower extremities is clean, dry, intact without rashes.  MUSCULOSKELETAL:  Gait is non-antalgic.  The patient is able to heel and toe walk without any difficulty.  Mild tenderness over the right L4-L5, L5-S1 lumbar paraspinal muscles.      The patient has 5/5 strength for the bilateral hip flexors, knee flexors/extensors, ankle dorsiflexors/plantar flexors, ankle evertors/invertors.    NEUROLOGICAL:  2/4 patellar, medial hamstring, achilles reflexes which are symmetric bilaterally.  No ankle clonus bilaterally.  Sensation to light touch is intact in the bilateral L4, L5, and S1 dermatomes.       RESULTS:      MR LUMBAR SPINE WO CONTRAST  7/26/2017 9:03 AM       INDICATION: Lower back pain and right hip pain, radiculopathy.  TECHNIQUE: Unenhanced.  COMPARISON: None.      FINDINGS: 5 lumbar vertebrae are assumed.       T12-L1: Normal disc signal and height. Small chronic Schmorl's node inferior endplate T12. Facet joints negative. No significant central canal or foraminal stenosis.      L1-L2: Normal disc signal and height. Facet joints negative. No significant central canal or foraminal stenosis.      L2-L3: Normal disc signal and height. Facet joints negative. No significant central canal or foraminal stenosis.      L3-L4: Normal disc signal and height. Mild annular bulge. Minimal degenerative facet arthropathy. No significant central canal stenosis. Mild bilateral foraminal stenosis. Facet joints negative. No significant central canal or foraminal stenosis.      L4-L5: Disc dehydration. Slight disc space loss. Annular bulge. Small posterior annular tear. Minimal degenerative facet arthropathy. No significant central canal stenosis. Mild to moderate bilateral foraminal stenosis.      L5-S1: Disc dehydration. Mild disc space loss. Annular bulge. There is a superimposed moderate-sized shallow right paracentral disc protrusion which moderately encroaches on the  right lateral recess and mildly displaces the traversing right S1 nerve   root. Facet joints negative. Moderate bilateral foraminal stenosis.      Conus and intraspinal contents otherwise negative. No significant marrow signal abnormality. Developmentally short pedicles throughout the lumbar spine predisposes to central canal and foraminal stenosis. Degenerative change right SI joint. No   significant paravertebral soft tissue abnormality.      IMPRESSION:   CONCLUSION:  1.  Mild degenerative changes in the lower lumbar spine.  2.  At L5-S1 there is a moderate sized broad-based right paracentral disc protrusion which moderately encroaches on the right lateral recess and displaces and likely impinges on the traversing right S1 nerve root. There is moderate bilateral foraminal   stenosis at this level.  3.  At L4-L5 there is mild to moderate bilateral foraminal stenosis.  4.  At L3-L4 there is mild bilateral foraminal stenosis.

## 2021-06-12 NOTE — ANESTHESIA CARE TRANSFER NOTE
Last vitals:   Vitals:    08/28/17 1747   BP: 158/80   Pulse: 90   Resp: 18   Temp: 36.6  C (97.9  F)   SpO2: 99%     Patient's level of consciousness is drowsy  Spontaneous respirations: yes  Maintains airway independently: yes  Dentition unchanged: yes  Oropharynx: oropharynx clear of all foreign objects    QCDR Measures:  ASA# 20 - Surgical Safety Checklist: WHO surgical safety checklist completed prior to induction  PQRS# 430 - Adult PONV Prevention: 4558F - Pt received => 2 anti-emetic agents (different classes) preop & intraop  ASA# 8 - Peds PONV Prevention: NA - Not pediatric patient, not GA or 2 or more risk factors NOT present  PQRS# 424 - Melodie-op Temp Management: 4559F - At least one body temp DOCUMENTED => 35.5C or 95.9F within required timeframe  PQRS# 426 - PACU Transfer Protocol: - Transfer of care checklist used  ASA# 14 - Acute Post-op Pain: ASA14B - Patient did NOT experience pain >= 7 out of 10

## 2021-06-13 NOTE — PROGRESS NOTES
Assessment:   José Antonio Reynoso is a 40 y.o. male who is sp redo L5-S1 microscopic discectomy on 5/28/2020 who is struggling with persistent right low back pain     Plan:   MRI and lumbar spine flexion/extension xrays with plans for follow-up appt for review and update exam    Subjective:   José Antonio Reynoso is a 40 y.o. male who submitted an eVisit request for evaluation of his Follow-up (on back symptoms. ).  See the questionnaire and message section of encounter report for information related to history of present illness and review of systems.    Burning pain in the right low back - will resolve short term while seated in a chair, but it is otherwise constant  It is a little lower than his incision  Getting some pain that goes into the top portion of the buttock but nothing radiating down the leg  This is a pain that was present prior to surgery but did not resolve.  Pain average: 4-5/10  At night it gets worse and it makes things more difficult because he is a side and stomach sleeper- stomach sleeping seems to be more bothersome.   No obvious aggravating factors  No obvious relieving factors   But he has been avoiding lifting, excessive activity  Denies numbness/tingling  Notes he can stand on his right foot and is able to pull onto his toes and back onto his heel  Physical therapy was initially helpful but then it would get aggravated with certain activities.     The following portions of the patient's history were reviewed and updated as appropriate:  He does not have any pertinent problems on file.  He is allergic to ibuprofen..     Objective:   No exam performed today, patient submitted as eVisit.    Telephone encounter time: 9:22min    Jerri Young MD  12/10/20

## 2021-06-13 NOTE — TELEPHONE ENCOUNTER
Candy called on behalf of her , José Antonio. We had called to ask José Antonio how he is doing after his surgery with Dr. Young in May of 2020. José Antonio is out of town this week but would appreciate a call next week to go over how he is doing. Please call José Antonio at [phone] next week. He is experiencing some new pain and would still like to talk to someone.

## 2021-06-13 NOTE — TELEPHONE ENCOUNTER
"José Antonio Reynoso is status post Redo Right Lumbar 5 - Sacral 1 hemilaminectomy, medial facetectomy with microscopic discectomy on 5/28/20.  Last telephone visit on 7/9/2020, was referred to PT.  Call placed to José Antonio this morning to see how he is doing. He reported seldom \"sharp\" pain in his low back and buttocks associated with activity. Denies numbness, tingling, or weakness in LE. He enquired a telephone visit with Dr. Young - referred to our .  Dulce Maria Pozo, RN, CNRN       "

## 2021-06-13 NOTE — PROGRESS NOTES
José Antonio is doing telephone visit to f/u on low back pain. He is s/p Redo Right Lumbar 5 - Sacral 1 hemilaminectomy, medial facetectomy with microscopic discectomy on 5/28/20 by Dr. Young. He states nerve pain is gone. He just has a dull ache in right low back. Sleeping is bad, due to being a stomach sleeper. He also states sitting in a vehicle irritates it. He denies any numbness, tingling, or weakness. Zara,CMA

## 2021-06-13 NOTE — PROGRESS NOTES
CHART NOTE     DATE OF SERVICE:  10/6/2017     : 1980   37 y.o.     ASSESSMENT :   Doing well with improvement in symptoms and function.      PLAN: Referral to outpatient physical therapy. May gradually start adding back weight to lifting restrictions and gradually increase all activities as tolerated. RTC only as needed.    HPI:  José Antonio Reynoso is status post Right L5-S1 HLMD on 2017 by Dr. Raya Disla. Patient initially presented with right lower extremity radiculopathy including pain, numbness and weakness worse with positional irritation.        TODAY, José Antonio Reynoso reports occasional radicular pain in R buttocks when looking upward. Otherwise, reports no numbness and weakness. Walking daily and tolerating activities well.  Works in [employer]. Will return to work light duty next week.        PAST MEDICAL HISTORY, SURGICAL HISTORY, REVIEW OF SYMPTOMS, MEDICATIONS AND ALLERGIES:  Past medical history, surgical history, ROS, medications and allergies reviewed with patient and remain unchanged from previous visit.    Past Medical History:   Diagnosis Date     Back pain      Kidney failure      Kidney stones        PHYSICAL EXAM:    There were no vitals taken for this visit.    Neurological exam reveals:  Respirations easy, non-labored.   Skin: W/D/I. No rashes, lesions or breaks in integrity.   Recent and remote memory intact, fund of knowledge wnl.    Leg strength bilateral dorsiflexion, plantar flexion, and hip flexion 5/5  No extremity edema noted.   Can heel/toe walk, do toe rises and squats without difficulty.   Muscle Bulk and tone wnl.   Reflexes: No pathological reflexes   Gait and station:Normal  Incision: CDI without erythema or edema  NDI/GENARO: 10%     RADIOGRAPHIC IMAGING: N/A

## 2021-06-14 NOTE — TELEPHONE ENCOUNTER
Patient stated he was waiting for a callback yesterday afternoon from Dr. Young but she didn't call. Per the patient, Dr. Young was going to speak to one of the other surgeons and call him back with the next steps.    I did make patient aware that Dr. Young is in surgery all day and not to expect a call today. I did let patient know I'd route a message as a reminder to call him.    Best number: [phone].

## 2021-06-15 NOTE — PROGRESS NOTES
Patient is here for a referral from Dr. Young. He has right sided low back pain. Denies weakness, numbness or tingling. Mild improvement but is still having pain that makes it difficult to sleep.  Haylee Chris,[facility],9:51 AM     Modified Oswestry Low back Pain Disability Questionnaire    1. PAIN INTENSITY  0- I can tolerate the pain I have without having to use pain medication  2. PERSONAL CARE  1- I can take care of myself normally, but it increases my pain.   3. LIFTING  1- I can lift heavy weights, but it gives me extra pain  4. WALKING  0- Pain does not prevent me from walking any distance  5. SITTING  2- Pain prevents me from sitting more than 1 hour.  6. STANDING  1- I can stand as long as I want, but it increases my pain.  7. SLEEPING  4- Even when I take medication, I sleep less than 2 hours.  8. SOCIAL LIFE  1- My social life is normal, but it increases my level of pain.             9. TRAVELING  1- I can travel anywhere, but it increases my pain.  10. EMPLOYMENT/HOMEMAKING  1- My normal homemaking/job activities increase my pain, but I can still perform all that is required of me.    SCORE:12 x2=24%

## 2021-06-15 NOTE — TELEPHONE ENCOUNTER
Patient saw Dr. Young on 2/9/21. She is referring patient for a consult w Dr[facility] Cro[facility]. [facility]

## 2021-06-15 NOTE — PROGRESS NOTES
The patient is a 40-year-old male.  I met with him and his wife.  He has had 2 back operations at the L5-S1 level on the right.  The first surgery was by Dr. Disla, the second surgery by Dr. Young.  He now complains of midline spine pain and burning pain going into the right butt.  He does not have weakness or numbness in the right leg.  He does not have left leg symptoms.  He says he is refractory to conservative treatment.  He says he is otherwise in good health.  I showed the MRI pictures to the patient and his wife.  He appears to have a herniated disc at L5-S1 on the right.  We talked about what would be involved in a redo laminectomy with discectomy.  We included in the discussion the nature of the problem, nature of the surgery, rationale for doing it, goals, benefits, alternatives, and significant risks and complications.  I answered all their questions.  They said they were satisfied with the explanation and understood.  They are going to discuss the issue at home.  They will let us know what they would like done next.  They are satisfied with the plan.  Total time 30 minutes, more than 50% spent counseling and/or coordinating care.

## 2021-06-16 PROBLEM — M51.26 LUMBAR DISC HERNIATION: Status: ACTIVE | Noted: 2020-05-14

## 2021-06-16 PROBLEM — M54.18 RIGHT SACRAL RADICULOPATHY: Status: ACTIVE | Noted: 2020-05-14

## 2021-06-20 NOTE — LETTER
Letter by Jerri Young MD at      Author: Jerri Young MD Service: -- Author Type: --    Filed:  Encounter Date: 5/15/2020 Status: (Other)       Dear Mr. Reynoso,    This letter will help in preparation for your upcoming surgery. Please contact us with any additional questions you may have regarding your surgery. Contact information for your surgery scheduler:   [place] [facility] [place]: [phone] ~ Hector Mckoy, and [place]: [phone] ~ [place]    You are scheduled for: Lumbar Hemilaminectomy/Microdiscectomy  With: Jerri Young M.D.  Date/Time: Thursday, May 28, 2020 at 11:00 a.m. (time subject to change)  Location: [place] [address]    Check in at the Welcome Desk inside the main doors of the hospital. An escort will take you to the surgery waiting area. A nurse from [facility] (surgery admit unit) at the hospital will call you with your exact arrival time to the hospital - typically one-and-a-half to two-and-a-half hours prior to your scheduled surgery time.     In the event of an emergency surgery case, there may be an adjustment to your start time for surgery.     PREPARING FOR YOUR SURGERY    *Pre-op Physical: Completed on 05/13/2020 at [facility].      *Please discuss the necessity of receiving a pneumococcal vaccine prior to surgery at your pre-op physical. Recommended for all patients over the age of 65, or based on certain medical conditions.     *After the pre-op physical is complete, please have your clinic fax the visit note to your surgery scheduler at [phone].    *Pre-op Lab Work: Please have labs drawn, per Dr. Young, at [facility] May 18th, 19th, or 20th. I faxed the lab orders to them so they will know what to drawn.     *Readiness Visit: Dr. Young's nurse will call you prior to the day of surgery to go over the results of your pre-op physical/lab work, along with any additional information you will  need for the day of surgery.     *COVID-19 Pre Surgery Testing: [facility] will call you to schedule your pre-surgery COVID-19 test. You will be scheduled 48-72 hours prior to the day of surgery.     *Ensure that you have completed your pre-op physical, along with any other necessary tests/appointments (listed above), prior to your Readiness Visit.           ADDITIONAL INFORMATION REGARDING YOUR SURGERY    Medications    Bring a list ALL of your medications, including any over-the-counter vitamins and herbal supplements to your Readiness Visit, and on the day of surgery.    DO NOT bring your medications with you the day of surgery.    Please see attached third sheet for more details on medications/vitamins/herbal supplements that should be discontinued prior to your surgery date.     If you are unsure if you should discontinue a medication/ vitamin/herbal supplement, please call our office and discuss with a nurse.    Continue taking your medications/vitamins/herbal supplements unless they are on the attached list.     Failure to follow the instructions regarding medications/vitamins/herbal supplements will result in cancellation of your surgery.    Day BEFORE Surgery    DO NOT shave near your surgical site. This can cause irritation of the skin    Using a washcloth and provided bottle of Hibiclens, shower the night BEFORE surgery, using a half bottle of Hibiclens to wash your body, avoiding face and genitals. The morning OF surgery, shower and use the second half of the bottle to wash your body, avoiding face and genitals. If you are unable to take a shower the morning of surgery, please discuss your options with the nurse at your readiness visit.     NOTHING  to eat after 11:00 p.m. the night prior to your procedure    CLEAR LIQUIDS: May have the following liquids up to two (2) hours before your arrival time at the hospital: water, plain black coffee (no cream or milk), plain black tea or plain green tea  (no cream or milk), Gatorade or Propel Water.    SMOKING: Stop smoking as far before surgery as possible, or as directed by your surgeon. NO tobacco products of any kind (cigarettes, e-cigarettes, chewing tobacco) beginning at 11:00 p.m. the night prior to your procedure.     ALCOHOL: You should stop drinking alcohol beginning at 11:00 p.m. the night prior to your procedure    Contact our office if you have symptoms of illness such as a fever of 101 or greater, chills, cough, sore throat, or if you develop a rash or any open sore    Day OF Surgery    If youve been instructed to take a medication(s) on the morning of surgery, please take with a very small sip of water.    Wear loose & comfortable clothing and flat shoes, Leave jewelry/valuables at home. If you wear contact lenses, remove them at home and wear glasses. Remove any body piercings. Remove nail polish.     Planning for Discharge    Start planning for your care after discharge as soon as you receive this letter.    If you have not made arrangements to have someone take you home and stay with you for the first 48 hours after discharge, your surgery will be cancelled.        PRE-OPERATIVE MEDICATION INSTRUCTIONS  Review this information with your primary care physician prior to discontinuing any of the medications listed below.  Notify your primary care physician that you have been instructed to discontinue these medications.    TEN (10) Days Prior to Surgery, STOP the Following Medications   [place]  Anacin  Aspirin  Excedrin  Pepto Bismol    **Before taking ANY over-the-counter medications, check the label for Aspirin   Non-steroidal   Anti-inflammatory Medications (NSAIDS)    Celebrex  Diclofenac (Cataflam)  Etodolac (Iodine)  Fenoprofen (Nalfon)  Ibuprofen (Advil, Motrin, Nuprin)  Indomethacin (Indocin)  Ketoprofen  Ketorolac (Toradol)  Melaxicam (Mobic)  Naproxen (AnaProx, Aleve, Naprosyn)  Relafen (Nabumetone)   Herbal Supplements (this is a  partial list of herbals to be discontinued)    Elza Schuster  Ephedra  Feverfew  Fish Oil  Flaxseed Oil  Garlic  Deirdre  Gingko  Ginseng  Goldenseal  Imitrex (Sumatriptan)  Kava  Krill Oil  Licorice  Multi Vitamins  Audubon Wort  Valerian  Vitamin E  Yohimbe   CHECK WITH YOUR PRESCRIBING DOCTOR BEFORE STOPPING ANY BLOOD THINNERS (approximately 7 days prior to surgery)  (Coumadin, Plavix, Platel, Aggrenox, Effient (Prasugrel), Ticlid), Xarelto, and Pradaxa      ALWAYS CHECK WITH YOUR PRESCRIBING DOCTOR REGARDING THE MEDICATIONS LISTED BELOW; RECOMMENDED STOP TIME IS ALSO LISTED      If you are taking Lovenox, discontinue 24 HOURS prior to surgery    If you are taking weight loss medication, discontinue 7 days prior to surgery    If you are taking Metformin or Simvastatin, check with your primary care physician (or whoever has prescribed you this medication) regarding when to discontinue prior to surgery

## 2021-06-21 NOTE — PROGRESS NOTES
Assessment:     Diagnoses and all orders for this visit:    Acute midline low back pain without sciatica  -     cyclobenzaprine (FLEXERIL) 5 MG tablet; Take 1-2 tablets (5-10 mg total) by mouth 2 (two) times a day as needed for muscle spasms.  Dispense: 30 tablet; Refill: 0  -     methylPREDNISolone (MEDROL, ELLEN,) 4 mg tablet; 3 tablets daily for 3 days, then 2 tablets daily for 3 days, then 1 tablet daily for 3 days then stop  Dispense: 18 tablet; Refill: 0    History of lumbar surgery    Annular tear of lumbar disc    Lumbar facet arthropathy    Myofascial pain  -     cyclobenzaprine (FLEXERIL) 5 MG tablet; Take 1-2 tablets (5-10 mg total) by mouth 2 (two) times a day as needed for muscle spasms.  Dispense: 30 tablet; Refill: 0     José Antonio Reynoso is a 38 y.o. y.o. male prior patient of NIYA Krueger last seen before surgery 8/24/2017, with past medical history significant for current smoker, right L5-S1 microdiscectomy 8/28/2017 Dr. Disla who presents today for follow-up regarding:    -Acute flareup midline low back pain at the belt line without radicular symptoms.  Previous MRI did show small annular tear at L4-5 which could be a culprit of his pain does have also facet arthropathy that is mild however he does have increased pain with facet loading today as well as forward flexing.    Patient is neurologically intact on exam.    GENARO: 22  WHO-5: 17     Plan:     A shared decision making plan was used. The patient's values and choices were respected. Prior medical records from 8/2017 to current were reviewed today. The following represents what was discussed and decided upon by the provider and the patient.        -DIAGNOSTIC TESTS: Images were personally reviewed and interpreted.   --If pain becomes severe again I would recommend obtaining a updated lumbar spine MRI with and without contrast.  --Lumbar spine MRI 7/27/2017 shows moderate size right paracentral disc protrusion at L5-S1 causing right S1 nerve root  compression and moderate bilateral foraminal stenosis.  L4-5 mild to moderate bilateral foraminal stenosis and mild bilaterally at L3-4.    -INTERVENTIONS: No recommendations for injections at this time.    -MEDICATIONS: Did prescribe Medrol Dosepak today.  -Also prescribed Flexeril 5 mg 1-2 tablets twice daily as needed for myofascial pain.  Discussed side effects of medications and proper use. Patient verbalized understanding.    -PHYSICAL THERAPY: Did encourage patient to trial physical therapy at this time however he does not feel he is time for it therefore did give him some home exercises to trial.  Did discuss if his pain is not improving and still mild to moderate I would recommend trialing physical therapy prior to considering MRI.  Discussed the importance of core strengthening, ROM, stretching exercises with the patient and how each of these entities is important in decreasing pain.  Explained to the patient that the purpose of physical therapy is to teach the patient a home exercise program.  These exercises need to be performed every day in order to decrease pain and prevent future occurrences of pain.        -PATIENT EDUCATION:  40 minutes of total visit time was spent face to face with the patient today, 60 % of the visit was spent on counseling, education, and coordinating care.   -5 minutes spent outside of visit time, non-face-to-face time, reviewing chart.    -FOLLOW UP: Follow-up as needed if symptoms are not improving in 4 weeks time, sooner if pain is worsening at any time  Advised to contact clinic if symptoms worsen or change.    Subjective:     José Antonio Reynoso is a 38 y.o. male who presents today for follow-up regarding acute flareup of midline low back pain centered at the belt line after lifting over 100 pounds last Thursday, pain however has slightly improved currently is a 4/10 but does worsen first thing in the morning as well as towards the end of the day or with sitting for long periods  of time as well as bending in general.    Patient reports that this is very different than the pain he had previously as he is having no pain into the lower extremities, no numbness or tingling sensations, no weakness or recent trips or falls, no bowel or bladder dysfunction.    Treatment to Date: Right L5-S1 microdiscectomy 8/28/2017 Dr. Disla  Physical therapy times 8 weeks at [facility] prior to surgery.    Medications:   Over-the-counter Tylenol with no benefit  **Patient unable to take ibuprofen/Advil/NSAID medications as he has taken it in the past and he had acute kidney failure.    Patient Active Problem List   Diagnosis     Herniated lumbar intervertebral disc       No current outpatient prescriptions on file prior to encounter.     No current facility-administered medications on file prior to encounter.        Allergies   Allergen Reactions     Ibuprofen      Kidney failure       Past Medical History:   Diagnosis Date     Back pain      Kidney failure      Kidney stones         Review of Systems  ROS: Positive for back pain specifically negative for bowel/bladder dysfunction, balance changes, headache, dizziness, foot drop, fevers, chills, appetite changes, nausea/vomiting, unexplained weight loss. Otherwise 13 systems reviewed are negative. Please see the patient's intake questionnaire from today for details.    Reviewed Social, Family, Past Medical and Past Surgical history with patient, no significant changes noted since prior visit.     Objective:     /59 (Patient Site: Right Arm, Patient Position: Sitting)  Pulse 68  Wt (!) 237 lb (107.5 kg)  SpO2 98%  BMI 34.01 kg/m2    PHYSICAL EXAMINATION:    --CONSTITUTIONAL: Well developed, well nourished, healthy appearing individual.  --PSYCHIATRIC: Appropriate mood and affect. No difficulty interacting due to temper, social withdrawal, or memory issues.  --SKIN: Lumbar region is dry and intact.   --RESPIRATORY: Normal rhythm and effort. No  abnormal accessory muscle breathing patterns noted.   --MUSCULOSKELETAL:  Normal lumbar lordosis noted, no lateral shift.  --GROSS MOTOR: Easily arises from a seated position. Gait is non-antalgic  --LUMBAR SPINE:  Inspection reveals no evidence of deformity. Range of motion is limited in lumbar flexion, and lumbar extension as well as extension and lateral rotation simultaneously bilaterally. No tenderness to palpation lumbar spine. Straight leg raising in the supine position is negative to radicular pain. Sciatic notch non-tender.   --SACROILIAC JOINT: One Finger point test negative.  --LOWER EXTREMITY MOTOR TESTING:  Plantar flexion left 5/5, right 5/5   Dorsiflexion left 5/5, right 5/5   Great toe MTP extension left 5/5, right 5/5  Knee flexion left 5/5, right 5/5  Knee extension left 5/5, right 5/5   Hip flexion left 5/5, right 5/5  Hip abduction left 5/5, right 5/5  Hip adduction left 5/5, right 5/5   --HIPS: Full range of motion bilaterally.   --NEUROLOGIC: Bilateral patellar and achilles reflexes are physiologic and symmetric. Sensation to light touch is intact in the bilateral L4, L5, and S1 dermatomes.    RESULTS:   Imaging: Lumbar spine imaging was reviewed today. The images were shown to the patient and the findings were explained using a spine model.      MR LUMBAR SPINE WO CONTRAST  7/26/2017   INDICATION: Lower back pain and right hip pain, radiculopathy.  TECHNIQUE: Unenhanced.  COMPARISON: None.  FINDINGS: 5 lumbar vertebrae are assumed.   T12-L1: Normal disc signal and height. Small chronic Schmorl's node inferior endplate T12. Facet joints negative. No significant central canal or foraminal stenosis.  L1-L2: Normal disc signal and height. Facet joints negative. No significant central canal or foraminal stenosis.  L2-L3: Normal disc signal and height. Facet joints negative. No significant central canal or foraminal stenosis.  L3-L4: Normal disc signal and height. Mild annular bulge. Minimal  degenerative facet arthropathy. No significant central canal stenosis. Mild bilateral foraminal stenosis. Facet joints negative. No significant central canal or foraminal stenosis.  L4-L5: Disc dehydration. Slight disc space loss. Annular bulge. Small posterior annular tear. Minimal degenerative facet arthropathy. No significant central canal stenosis. Mild to moderate bilateral foraminal stenosis.  L5-S1: Disc dehydration. Mild disc space loss. Annular bulge. There is a superimposed moderate-sized shallow right paracentral disc protrusion which moderately encroaches on the right lateral recess and mildly displaces the traversing right S1 nerve root. Facet joints negative. Moderate bilateral foraminal stenosis.  Conus and intraspinal contents otherwise negative. No significant marrow signal abnormality. Developmentally short pedicles throughout the lumbar spine predisposes to central canal and foraminal stenosis. Degenerative change right SI joint. No   significant paravertebral soft tissue abnormality.  CONCLUSION:  1.  Mild degenerative changes in the lower lumbar spine.  2.  At L5-S1 there is a moderate sized broad-based right paracentral disc protrusion which moderately encroaches on the right lateral recess and displaces and likely impinges on the traversing right S1 nerve root. There is moderate bilateral foraminal   stenosis at this level.  3.  At L4-L5 there is mild to moderate bilateral foraminal stenosis.  4.  At L3-L4 there is mild bilateral foraminal stenosis.

## 2021-06-26 ENCOUNTER — HEALTH MAINTENANCE LETTER (OUTPATIENT)
Age: 41
End: 2021-06-26

## 2021-07-03 NOTE — ANESTHESIA PREPROCEDURE EVALUATION
Anesthesia Preprocedure Evaluation by Xavier Fonseca MD at 8/28/2017 10:07 AM     Author: Xavier Fonseca MD Service: -- Author Type: Physician    Filed: 8/28/2017  1:39 PM Date of Service: 8/28/2017 10:07 AM Status: Addendum    : Xavier Fonseca MD (Physician)    Related Notes: Original Note by Xavier Fonseca MD (Physician) filed at 8/28/2017 10:08 AM       Anesthesia Evaluation      Patient summary reviewed   No history of anesthetic complications     Airway   Mallampati: II  Neck ROM: full   Pulmonary - normal exam    breath sounds clear to auscultation  (+) a smoker                         Cardiovascular - negative ROS and normal exam  Exercise tolerance: > or = 4 METS  (-) murmur  Rhythm: regular  Rate: normal,    no murmur      Neuro/Psych    (+) neuromuscular disease,      Comments: Lumbar herniated disc    Endo/Other    (+) obesity (BMI 33),      GI/Hepatic/Renal    (-) GERD     Other findings: 07/26/17 0903 MR Lumbar Spine Without Contrast View Image  Impression:   CONCLUSION:  1. Mild degenerative changes in the lower lumbar spine.  2. At L5-S1 there is a moderate sized broad-based right paracentral disc protrusion which moderately encroaches on the right lateral recess and displaces and likely impinges on the traversing right S1 nerve root. There is moderate bilateral foraminal   stenosis at this level.  3. At L4-L5 there is mild to moderate bilateral foraminal stenosis.  4. At L3-L4 there is mild bilateral foraminal stenosis.                 Dental    (+) bridge                           Anesthesia Plan  Planned anesthetic: general endotracheal  Decadron 10 mg IV  Zofran  ASA 2   Induction: intravenous   Anesthetic plan and risks discussed with: patient  Anesthesia plan special considerations: antiemetics,   Post-op plan: routine recovery

## 2021-07-12 NOTE — PROGRESS NOTES
Progress Notes by Jerri Young MD at 1/26/2021  2:00 PM     Author: Jerri Young MD Service: -- Author Type: Physician    Filed: 7/11/2021 10:47 PM Encounter Date: 1/26/2021 Status: Signed    : Jerri Young MD (Physician)       Neurosurgery Progress Note  01/26/21    A/P: José Antonio Reynoso is a 40 y.o. male who is sp redo L5-S1 microscopic discectomy on 5/28/2020 for recurrent lumbar disc herniation; pt with improvement in lower extremity radicular pain but continues with low back pain and buttock pain    I reviewed José Antonio's imaging with him today in clinic noting that there does appear to be a small persistent or recurrent component of lumbar disc herniation underlying the S1 nerve root at our site of redo discectomy. Per review of my op note, I can see that at this area, I was unable to liberate the nerve from the underlying annulus due to dense scar tissue and as such it is possible that there could be a small retained fragment within this scarred area. I noted that I would not be able to guarantee resolution of his symptoms w additional surgery. However, additional surgery could either include a fusion or attempt at redo discectomy. I told José Antonio that I would like to review his images further with my partners to get opinions on likelihood of further clinical improvement with redo discectomy    S: Continues with predominantly burning pain in the right low back which is fairly constant. Continues to have a radiating component into the right buttock but nothing farther into the leg  Getting some pain that goes into the top portion of the buttock but nothing radiating down the leg  Pain average: 4-5/10  Symptoms aggravated at night time    PMH: No interval change  PSH: No interval change    O:  Vitals:    01/26/21 1408   BP: 105/64   Pulse: 69   SpO2: 96%     General: Awake, alert, NAD  Neuro: Awake, alert, speech is clear and content is appropriate. MAEW x 4 with full strength in b/l UE and LE.  Sensation is intact to temperature and LT. Vibratory sense is intact in the bl great toe  Coordination: Pt is able to heel and toe walk without difficulty  Psych: Appropriate mood and affect, pleasant, cooperative, alert and oriented x 3  Skin: Incision C/D/I    Jerri Young MD

## 2021-07-12 NOTE — PROGRESS NOTES
Progress Notes by Jenn Fulton at 1/26/2021  2:00 PM     Author: Jenn Fulton Service: -- Author Type: Patient Access    Filed: 7/11/2021 10:47 PM Encounter Date: 1/26/2021 Status: Signed    : Jenn Fulton (Patient Access)       José Antonio is here to f/u on new MRI and XR results. He states symptoms are unchanged since last seen in clinic. He c/o right low back pain. He denies any radicular symptoms.   Modified Oswestry Low back Pain Disability Questionnaire  1. PAIN INTENSITY  1- The pain is bad, but I manage without taking pain medication.  2. PERSONAL CARE  1- I can take care of myself normally, but it increases my pain.   3. LIFTING  2- Pain prevents me from lifting heavy weights off the floor but I can manage if they are conveniently placed  4. WALKING  1- Pain prevents me from walking more than 1 mile  5. SITTING  2- Pain prevents me from sitting more than 1 hour.  6. STANDING  1- I can stand as long as I want, but it increases my pain.  7. SLEEPING  5- Pain prevents me from sleeping at all.  8. SOCIAL LIFE  2- Pain prevents me from participating in more energetic activities (e.g., sports, dancing).  9. TRAVELING  1- I can travel anywhere, but it increases my pain.  10. EMPLOYMENT/HOMEMAKING  1- My normal homemaking/job activities increase my pain, but I can still perform all that is required of me.    SCORE:__17__ x2=__34%____

## 2021-07-12 NOTE — PROGRESS NOTES
Progress Notes by Jerri Young MD at 2/5/2021  1:00 PM     Author: Jerri Young MD Service: -- Author Type: Physician    Filed: 7/11/2021 10:29 PM Encounter Date: 2/5/2021 Status: Signed    : Jerri Young MD (Physician)         Assessment:   José Antonio Reynoso is a 40 y.o. male who is sp redo L5-S1 microscopic discectomy on 5/28/2020 for recurrent lumbar disc herniation; pt with improvement in lower extremity radicular pain but continues with low back pain and buttock pain  Plan:   I reviewed José Antonio's imaging with Dr. Sonido Crooks who does feel like it is possible to attempt redo microscopic discectomy; I explained that in my hands, I am not confident that an additional conservative surgery would be beneficial, but the alternative would be to perform an instrumented fusion and we briefly discussed the risks of this. I have placed a referral for José Antonio to meet with Dr. Crooks to hear his surgical options with repeat discectomy and he will decide if he would like to move forward with any additional surgery.    Subjective:   José Antonio Reynoso is a 40 y.o. male who submitted an eVisit request for evaluation of his Follow-up (Next steps )       See the questionnaire and message section of encounter report for information related to history of present illness and review of systems.    No significant interval change in symptoms. Cont w low back pain and right buttock pain- no additional radicular component in the leg.    The following portions of the patient's history were reviewed and updated as appropriate:  He does not have any pertinent problems on file.  He is allergic to ibuprofen..     Objective:   No exam performed today, patient submitted as eVisit.    Telephone encounter time: 9:39min    Jerri Young MD  02/05/21

## 2021-07-12 NOTE — PROGRESS NOTES
Progress Notes by Jerri Young MD at 2/1/2021  9:00 AM     Author: Jerri Young MD Service: -- Author Type: Physician    Filed: 7/11/2021 10:31 PM Encounter Date: 2/1/2021 Status: Signed    : Jerri Young MD (Physician)       Please see encounter 2/5/2021

## 2021-10-11 ENCOUNTER — HEALTH MAINTENANCE LETTER (OUTPATIENT)
Age: 41
End: 2021-10-11

## 2022-07-17 ENCOUNTER — HEALTH MAINTENANCE LETTER (OUTPATIENT)
Age: 42
End: 2022-07-17

## 2022-09-25 ENCOUNTER — HEALTH MAINTENANCE LETTER (OUTPATIENT)
Age: 42
End: 2022-09-25

## 2023-08-05 ENCOUNTER — HEALTH MAINTENANCE LETTER (OUTPATIENT)
Age: 43
End: 2023-08-05

## 2024-11-05 NOTE — PROGRESS NOTES
Assessment/Plan:      Diagnoses and all orders for this visit:    Lumbar radicular pain  -     gabapentin (NEURONTIN) 100 MG capsule; 1 cap at bedtime x 3 days.  Then may take 2 caps at bedtime x 3 days, then may take 3 caps at bedtime  Dispense: 90 capsule; Refill: 2  -     MR Lumbar Spine With Without Contrast; Future; Expected date: 02/20/2020    DDD (degenerative disc disease), lumbar  -     gabapentin (NEURONTIN) 100 MG capsule; 1 cap at bedtime x 3 days.  Then may take 2 caps at bedtime x 3 days, then may take 3 caps at bedtime  Dispense: 90 capsule; Refill: 2  -     MR Lumbar Spine With Without Contrast; Future; Expected date: 02/20/2020    Paresthesia of both hands  -     EMG - Clinic; Future; Expected date: 02/20/2020    Sleep difficulties  -     gabapentin (NEURONTIN) 100 MG capsule; 1 cap at bedtime x 3 days.  Then may take 2 caps at bedtime x 3 days, then may take 3 caps at bedtime  Dispense: 90 capsule; Refill: 2        Assessment: Pleasant 39 y.o. male   with a history of anxiety and as a kid had kidney failure from ibuprofen that resolved, and lumbar microdiscectomy in 2017 with Dr. Disla with:    1.  6-week history of low back pain with right proximal lumbar radicular pain into the gluteal region.  This is worse than any flare he had in the past.  Since he also has absent right Achilles reflex which may be residual from his surgery but given his pain, need to evaluate for new nerve root compression.    2.  Lumbar degenerative disc disease L4-5 with broad-based disc bulge and annular tear on MRI from 2017.    3.  6-month history of bilateral hand numbness and tingling consistent with carpal tunnel syndrome.    4.  Poor sleep related to pain      Discussion:    1.  I discussed the diagnosis and treatment options.  We discussed his old MRI from 2017.  We discussed options of imaging, therapy, medications.  He wants minimal medications if he can wants to find out the cause for his pain and make sure there  is not any new damage since he has had surgery in the past.  He also had questions regarding his hand symptoms which were answered.    2.  MRI lumbar spine with and without contrast given his surgery w was within the past 3 years need contrast.  This will be to evaluate for any new right-sided L5 or S1 nerve impingement.    3.  Trial gabapentin for neuropathic pain and sleep.  Titrate up to 300 mg at bedtime.    4.  EMG bilateral upper extremities to evaluate for carpal tunnel syndrome.    5.  Follow-up after EMG to review images and discuss further treatment options.      It was our pleasure caring for your patient today, if there any questions or concerns please do not hesitate to contact us.      Subjective:   Patient ID: José Antonio Reynoso is a 39 y.o. male.    History of Present Illness: Patient presents for an evaluation of new low back pain with right gluteal pain and also has questions regarding hand numbness and tingling and forearm pain.  His low back pain has been present for the past 6 weeks.  Previously he had a right L5-S1 microdiscectomy by Dr. Disla in 2017 for right radicular pain.  He subsequently had a flare in October 2018 just of low back pain which was resolved after time and with Medrol Dosepak.  Over the past 6 weeks without any new injury has severe pain in the low back rating down the right gluteal region to the lateral thigh with dysesthesias.  This was insidious in onset and was quite severe waking up at night and still does wake him up at night.  Pain is fairly constant worse with any sitting or prolonged standing and first thing in the morning.  Better with changing positions and moving.  No numbness or tingling but the pain will cause a catch in his back and he feels weak but no focal weakness in the leg.  Has not had any recent physical therapy.  Does not take anti-inflammatories due to a bad renal issue as a kid with ibuprofen.    Over the past 6 months he is also developed bilateral hand  numbness and tingling all fingers are involved.  He did feel a snap in his left wrist a few weeks ago and has had some pain with  on the left as well.  All the fingers become numb with sleeping wakes him up at night worse with driving and talking on the phone as well.  Right equal to left and he is right-handed.  This also causes some poor sleep.    I did review prior notes from [facility] as well as the postop note from Dr. Disla.  She performed a right hemilaminectomy microdiscectomy August 20, 2017 for right S1 radiculopathy.      Imaging: MRI report and images were personally reviewed and discussed with the patient.  A plastic model was utilized during the discussion.  MRI of the lumbar spine personally reviewed from July 2017.  L5-S1 moderate right broad-based paracentral disc herniation compressing the right S1 nerve in the lateral recess.  Mild/moderate bilateral foraminal stenosis.  L4-5 mild to moderate foraminal stenosis with broad-based disc bulge and annular tear.Disc dehydration at that level.    Review of Systems: Denies change in vision, change in hearing, chest pain, shortness of breath, abdominal pain, nausea, vomiting.  No rashes.  He has no bowel or bladder incontinence, headaches, coordination problems swallowing difficulties falls, fevers recent illnesses or weight loss.        Past Medical History:   Diagnosis Date     Back pain      Kidney failure      Kidney stones        The following portions of the patient's history were reviewed and updated as appropriate: allergies, current medications, past family history, past medical history, past social history, past surgical history and problem list.           Objective:   Physical Exam:    Vitals:    02/20/20 0852   BP: 126/68   Pulse: (!) 59     Body mass index is 34.15 kg/m .      General:  Well-appearing male in no acute distress.  Pleasant, cooperative, and interactive throughout the examination and interview.  CV: No lower extremity  edema on inspection or paltation.  Lymphatics: No cervical lymphadenopathy palpated. Eyes: sclera clear. Skin: No rashes or lesions seen over the head/neck, hairline, arms, legs, trunk.  Respirations unlabored.  MSK: Gait is nonantalgic.  Able to heel-toe walk without difficulty.  Negative Romberg.  Spine: normal AP curves of the C, T, and L spine.  Decreased lumbar flexion and finger to floor testing.  Palpation: Mild tenderness over the right gluteal tissues.  Extremities: Full range of motion of the shoulders and abduction, elbows, and wrists with no effusions or tenderness to palpation.   Full range of motion of the hips, knees, and ankles with no effusions or tenderness to palpation.   No hypermobility of the upper or lower extremities.  Negative Tinel's at the wrist negative Phalen's.  Negative Tinel's at the elbows.  Neurologic exam: Mental status: Patient is alert and oriented with normal affect.  Attention, knowledge, memory, and language are intact.  Normal coordination throughout the examination.  Reflexes are 2+ and symmetric biceps, triceps, brachioradialis, patellar, and 0 right, 2+ left Achilles with down-going toes and Negative Yariel's.  Sensation is intact to light touch throughout the upper and lower extremities bilaterally.  Manual muscle testing reveals 5 out of 5 in the hip flexors, knee flexors/extensors, ankle plantar flexors, ankle  dorsiflexors, and EHL.  Upper extremities: 5/5 bilateral shoulder abductor's, elbow flexors, extensors, wrist extensors, interosseous and finger flexors.. Normal muscle bulk and tone in the arms and legs.    Negative seated a straight leg raise bilaterally.         Walker